# Patient Record
Sex: MALE | Race: WHITE | NOT HISPANIC OR LATINO | Employment: FULL TIME | URBAN - METROPOLITAN AREA
[De-identification: names, ages, dates, MRNs, and addresses within clinical notes are randomized per-mention and may not be internally consistent; named-entity substitution may affect disease eponyms.]

---

## 2023-01-31 PROBLEM — F90.9 ADHD: Status: ACTIVE | Noted: 2023-01-31

## 2023-01-31 PROBLEM — M75.101 ROTATOR CUFF TEAR, RIGHT: Status: ACTIVE | Noted: 2023-01-31

## 2023-01-31 PROBLEM — G47.00 INSOMNIA: Status: ACTIVE | Noted: 2023-01-31

## 2023-01-31 PROBLEM — M16.10 ARTHRITIS, HIP: Status: ACTIVE | Noted: 2023-01-31

## 2023-01-31 PROBLEM — E78.5 HYPERLIPIDEMIA: Status: ACTIVE | Noted: 2023-01-31

## 2023-01-31 PROBLEM — S62.101A WRIST FRACTURE, RIGHT: Status: ACTIVE | Noted: 2023-01-31

## 2023-01-31 PROBLEM — Z79.4 TYPE 2 DIABETES MELLITUS WITH HYPERGLYCEMIA, WITH LONG-TERM CURRENT USE OF INSULIN (MULTI): Status: ACTIVE | Noted: 2023-01-31

## 2023-01-31 PROBLEM — E11.65 TYPE 2 DIABETES MELLITUS WITH HYPERGLYCEMIA, WITH LONG-TERM CURRENT USE OF INSULIN (MULTI): Status: ACTIVE | Noted: 2023-01-31

## 2023-01-31 PROBLEM — M25.859 FEMORAL ACETABULAR IMPINGEMENT: Status: ACTIVE | Noted: 2023-01-31

## 2023-01-31 PROBLEM — M16.11 OSTEOARTHRITIS OF RIGHT HIP: Status: ACTIVE | Noted: 2023-01-31

## 2023-01-31 PROBLEM — I10 BENIGN ESSENTIAL HYPERTENSION: Status: ACTIVE | Noted: 2023-01-31

## 2023-01-31 PROBLEM — M25.531 RIGHT WRIST PAIN: Status: ACTIVE | Noted: 2023-01-31

## 2023-01-31 PROBLEM — D03.9 MELANOMA IN SITU OF THE SKIN (MULTI): Status: ACTIVE | Noted: 2023-01-31

## 2023-01-31 PROBLEM — E55.9 VITAMIN D DEFICIENCY: Status: ACTIVE | Noted: 2023-01-31

## 2023-01-31 PROBLEM — G47.33 OBSTRUCTIVE SLEEP APNEA: Status: ACTIVE | Noted: 2023-01-31

## 2023-01-31 PROBLEM — E29.1 HYPOGONADISM MALE: Status: ACTIVE | Noted: 2023-01-31

## 2023-01-31 PROBLEM — M19.031 PRIMARY OSTEOARTHRITIS OF RIGHT WRIST: Status: ACTIVE | Noted: 2023-01-31

## 2023-01-31 RX ORDER — DEXTROAMPHETAMINE SACCHARATE, AMPHETAMINE ASPARTATE MONOHYDRATE, DEXTROAMPHETAMINE SULFATE AND AMPHETAMINE SULFATE 7.5; 7.5; 7.5; 7.5 MG/1; MG/1; MG/1; MG/1
30 CAPSULE, EXTENDED RELEASE ORAL DAILY
COMMUNITY
Start: 2019-10-09 | End: 2023-03-13 | Stop reason: SDUPTHER

## 2023-01-31 RX ORDER — DIAPER,BRIEF,ADULT, DISPOSABLE
1 EACH MISCELLANEOUS DAILY
COMMUNITY
Start: 2020-09-04

## 2023-01-31 RX ORDER — DEXTROAMPHETAMINE SACCHARATE, AMPHETAMINE ASPARTATE MONOHYDRATE, DEXTROAMPHETAMINE SULFATE AND AMPHETAMINE SULFATE 3.75; 3.75; 3.75; 3.75 MG/1; MG/1; MG/1; MG/1
30 CAPSULE, EXTENDED RELEASE ORAL DAILY
COMMUNITY
End: 2023-03-13 | Stop reason: ALTCHOICE

## 2023-01-31 RX ORDER — SODIUM FLUORIDE1.1%, POTASSIUM NITRATE 5% 5.8; 57.5 MG/ML; MG/ML
GEL, DENTIFRICE DENTAL
COMMUNITY
Start: 2022-08-04 | End: 2023-10-26 | Stop reason: ALTCHOICE

## 2023-01-31 RX ORDER — FOSINOPRIL SODIUM 20 MG/1
1 TABLET ORAL DAILY
COMMUNITY
Start: 2020-04-08 | End: 2023-07-05 | Stop reason: SDUPTHER

## 2023-01-31 RX ORDER — ZOLPIDEM TARTRATE 10 MG/1
10 TABLET ORAL NIGHTLY PRN
COMMUNITY
Start: 2019-11-26 | End: 2023-05-16 | Stop reason: SDUPTHER

## 2023-01-31 RX ORDER — SIMVASTATIN 10 MG/1
1 TABLET, FILM COATED ORAL EVERY EVENING
COMMUNITY
Start: 2020-04-08 | End: 2023-11-14 | Stop reason: SDUPTHER

## 2023-01-31 RX ORDER — METFORMIN HYDROCHLORIDE 1000 MG/1
1 TABLET ORAL 2 TIMES DAILY
COMMUNITY
Start: 2020-07-22 | End: 2023-12-27 | Stop reason: SDUPTHER

## 2023-03-10 ENCOUNTER — TELEPHONE (OUTPATIENT)
Dept: PRIMARY CARE | Facility: CLINIC | Age: 73
End: 2023-03-10

## 2023-03-10 DIAGNOSIS — F90.9 ATTENTION DEFICIT HYPERACTIVITY DISORDER (ADHD), UNSPECIFIED ADHD TYPE: ICD-10-CM

## 2023-03-10 NOTE — TELEPHONE ENCOUNTER
Rx Refill Request Telephone Encounter    Name:  Dale Schrader  :  [id number]  Medication Name:  Amphetamine-Dextroamphet ER 30MG Oral Capsule delayed release take 1 cap daily for ADHD            Specific Pharmacy location:  [facility]  Date of last appointment:  2022  Date of next appointment:  2023  Best number to reach patient:  [phone]

## 2023-03-13 RX ORDER — DEXTROAMPHETAMINE SACCHARATE, AMPHETAMINE ASPARTATE MONOHYDRATE, DEXTROAMPHETAMINE SULFATE AND AMPHETAMINE SULFATE 7.5; 7.5; 7.5; 7.5 MG/1; MG/1; MG/1; MG/1
30 CAPSULE, EXTENDED RELEASE ORAL EVERY MORNING
Qty: 30 CAPSULE | Refills: 0 | Status: SHIPPED | OUTPATIENT
Start: 2023-03-13 | End: 2023-04-14 | Stop reason: SDUPTHER

## 2023-03-14 ENCOUNTER — OFFICE VISIT (OUTPATIENT)
Dept: PRIMARY CARE | Facility: CLINIC | Age: 73
End: 2023-03-14
Payer: COMMERCIAL

## 2023-03-14 VITALS
SYSTOLIC BLOOD PRESSURE: 154 MMHG | TEMPERATURE: 96.4 F | WEIGHT: 238 LBS | DIASTOLIC BLOOD PRESSURE: 87 MMHG | BODY MASS INDEX: 28.1 KG/M2 | OXYGEN SATURATION: 96 % | RESPIRATION RATE: 15 BRPM | HEIGHT: 77 IN | HEART RATE: 87 BPM

## 2023-03-14 DIAGNOSIS — E78.2 MIXED HYPERLIPIDEMIA: ICD-10-CM

## 2023-03-14 DIAGNOSIS — Z79.4 TYPE 2 DIABETES MELLITUS WITH HYPERGLYCEMIA, WITH LONG-TERM CURRENT USE OF INSULIN (MULTI): ICD-10-CM

## 2023-03-14 DIAGNOSIS — E55.9 VITAMIN D DEFICIENCY: ICD-10-CM

## 2023-03-14 DIAGNOSIS — F90.9 ATTENTION DEFICIT HYPERACTIVITY DISORDER (ADHD), UNSPECIFIED ADHD TYPE: ICD-10-CM

## 2023-03-14 DIAGNOSIS — E11.65 TYPE 2 DIABETES MELLITUS WITH HYPERGLYCEMIA, WITH LONG-TERM CURRENT USE OF INSULIN (MULTI): ICD-10-CM

## 2023-03-14 DIAGNOSIS — I10 BENIGN ESSENTIAL HYPERTENSION: Primary | ICD-10-CM

## 2023-03-14 LAB
ALANINE AMINOTRANSFERASE (SGPT) (U/L) IN SER/PLAS: 36 U/L (ref 10–52)
ALBUMIN (G/DL) IN SER/PLAS: 4.2 G/DL (ref 3.4–5)
ALKALINE PHOSPHATASE (U/L) IN SER/PLAS: 53 U/L (ref 33–136)
ANION GAP IN SER/PLAS: 12 MMOL/L (ref 10–20)
ASPARTATE AMINOTRANSFERASE (SGOT) (U/L) IN SER/PLAS: 33 U/L (ref 9–39)
BILIRUBIN TOTAL (MG/DL) IN SER/PLAS: 0.7 MG/DL (ref 0–1.2)
CALCIDIOL (25 OH VITAMIN D3) (NG/ML) IN SER/PLAS: 42 NG/ML
CALCIUM (MG/DL) IN SER/PLAS: 8.9 MG/DL (ref 8.6–10.3)
CARBON DIOXIDE, TOTAL (MMOL/L) IN SER/PLAS: 25 MMOL/L (ref 21–32)
CHLORIDE (MMOL/L) IN SER/PLAS: 105 MMOL/L (ref 98–107)
CHOLESTEROL (MG/DL) IN SER/PLAS: 135 MG/DL (ref 0–199)
CHOLESTEROL IN HDL (MG/DL) IN SER/PLAS: 46.7 MG/DL
CHOLESTEROL/HDL RATIO: 2.9
CREATININE (MG/DL) IN SER/PLAS: 0.86 MG/DL (ref 0.5–1.3)
ESTIMATED AVERAGE GLUCOSE FOR HBA1C: 160 MG/DL
GFR MALE: >90 ML/MIN/1.73M2
GLUCOSE (MG/DL) IN SER/PLAS: 151 MG/DL (ref 74–99)
HEMOGLOBIN A1C/HEMOGLOBIN TOTAL IN BLOOD: 7.2 %
LDL: 62 MG/DL (ref 0–99)
POTASSIUM (MMOL/L) IN SER/PLAS: 4.1 MMOL/L (ref 3.5–5.3)
PROTEIN TOTAL: 7 G/DL (ref 6.4–8.2)
SODIUM (MMOL/L) IN SER/PLAS: 138 MMOL/L (ref 136–145)
TRIGLYCERIDE (MG/DL) IN SER/PLAS: 133 MG/DL (ref 0–149)
UREA NITROGEN (MG/DL) IN SER/PLAS: 12 MG/DL (ref 6–23)
VLDL: 27 MG/DL (ref 0–40)

## 2023-03-14 PROCEDURE — 3079F DIAST BP 80-89 MM HG: CPT | Performed by: FAMILY MEDICINE

## 2023-03-14 PROCEDURE — 4010F ACE/ARB THERAPY RXD/TAKEN: CPT | Performed by: FAMILY MEDICINE

## 2023-03-14 PROCEDURE — 1036F TOBACCO NON-USER: CPT | Performed by: FAMILY MEDICINE

## 2023-03-14 PROCEDURE — 1160F RVW MEDS BY RX/DR IN RCRD: CPT | Performed by: FAMILY MEDICINE

## 2023-03-14 PROCEDURE — 1159F MED LIST DOCD IN RCRD: CPT | Performed by: FAMILY MEDICINE

## 2023-03-14 PROCEDURE — 3051F HG A1C>EQUAL 7.0%<8.0%: CPT | Performed by: FAMILY MEDICINE

## 2023-03-14 PROCEDURE — 3077F SYST BP >= 140 MM HG: CPT | Performed by: FAMILY MEDICINE

## 2023-03-14 PROCEDURE — 99214 OFFICE O/P EST MOD 30 MIN: CPT | Performed by: FAMILY MEDICINE

## 2023-03-14 RX ORDER — CHLORTHALIDONE 25 MG/1
25 TABLET ORAL DAILY
Qty: 30 TABLET | Refills: 5 | Status: SHIPPED | OUTPATIENT
Start: 2023-03-14 | End: 2023-07-05 | Stop reason: SDUPTHER

## 2023-03-14 ASSESSMENT — ENCOUNTER SYMPTOMS
CONFUSION: 0
FEVER: 0
OCCASIONAL FEELINGS OF UNSTEADINESS: 0
ABDOMINAL PAIN: 0
CHILLS: 0
SHORTNESS OF BREATH: 0
DEPRESSION: 0
ARTHRALGIAS: 0
CHEST TIGHTNESS: 0
PALPITATIONS: 0
LOSS OF SENSATION IN FEET: 0

## 2023-03-14 NOTE — ASSESSMENT & PLAN NOTE
HTN is not to goal add chlorthalidone 25 mg daily and recheck blood pressure in approximately 6 weeks

## 2023-03-14 NOTE — PROGRESS NOTES
"Subjective   Patient ID: Dale Schrader is a 72 y.o. male who presents for Follow-up (4 month).    HPI   Patient today for follow-up of ongoing healthcare issues as well as recheck on blood pressure.  He is monitoring blood pressure readings at home and he has been getting 150s to 160s over 80s.  Denies chest pain headache or shortness of breath.  Review of Systems   Constitutional:  Negative for chills and fever.   HENT:  Negative for congestion and ear pain.    Eyes:  Negative for visual disturbance.   Respiratory:  Negative for chest tightness and shortness of breath.    Cardiovascular:  Negative for chest pain and palpitations.   Gastrointestinal:  Negative for abdominal pain.   Musculoskeletal:  Negative for arthralgias.   Skin:  Negative for pallor.   Psychiatric/Behavioral:  Negative for confusion.        Objective   /87   Pulse 87   Temp 35.8 °C (96.4 °F)   Resp 15   Ht 1.956 m (6' 5\")   Wt 108 kg (238 lb)   SpO2 96%   BMI 28.22 kg/m²     Physical Exam  Vitals and nursing note reviewed.   Constitutional:       General: He is not in acute distress.     Appearance: Normal appearance. He is not ill-appearing.   HENT:      Head: Normocephalic and atraumatic.      Right Ear: Tympanic membrane, ear canal and external ear normal.      Left Ear: Tympanic membrane, ear canal and external ear normal.      Mouth/Throat:      Pharynx: Oropharynx is clear.   Eyes:      Extraocular Movements: Extraocular movements intact.   Cardiovascular:      Rate and Rhythm: Normal rate and regular rhythm.      Pulses: Normal pulses.      Heart sounds: Normal heart sounds.   Pulmonary:      Effort: Pulmonary effort is normal.      Breath sounds: Normal breath sounds.   Abdominal:      General: Abdomen is flat. Bowel sounds are normal.      Palpations: Abdomen is soft.      Tenderness: There is no abdominal tenderness.   Musculoskeletal:         General: Normal range of motion.      Cervical back: Neck supple.   Skin:   "   General: Skin is warm.   Neurological:      Mental Status: He is alert and oriented to person, place, and time. Mental status is at baseline.   Psychiatric:         Mood and Affect: Mood normal.     Return to office in 4 months with repeat fasting labs  Return to office in approximately 6 weeks for blood pressure recheck   Assessment/Plan   Problem List Items Addressed This Visit       ADHD     Clinically stable continue Adderall as prescribed         Benign essential hypertension - Primary     HTN is not to goal add chlorthalidone 25 mg daily and recheck blood pressure in approximately 6 weeks         Relevant Medications    chlorthalidone (Hygroton) 25 mg tablet    Other Relevant Orders    Follow Up In Primary Care    Follow Up In Primary Care    Comprehensive Metabolic Panel    Hyperlipidemia     Lipids to goal continue Zocor along with proper diet         Relevant Orders    Comprehensive Metabolic Panel    Lipid Panel    Vitamin D deficiency     D deficiency improved continue to supplement and monitor         Relevant Orders    Vitamin D 1,25 Dihydroxy    Type 2 diabetes mellitus with hyperglycemia, with long-term current use of insulin (CMS/[facility])     A1c down to 7.2% continue current medication         Relevant Orders    Follow Up In Primary Care    Comprehensive Metabolic Panel    Hemoglobin A1C

## 2023-04-12 ENCOUNTER — TELEPHONE (OUTPATIENT)
Dept: PRIMARY CARE | Facility: CLINIC | Age: 73
End: 2023-04-12

## 2023-04-12 DIAGNOSIS — F90.9 ATTENTION DEFICIT HYPERACTIVITY DISORDER (ADHD), UNSPECIFIED ADHD TYPE: ICD-10-CM

## 2023-04-12 NOTE — TELEPHONE ENCOUNTER
Rx Refill Request Telephone Encounter    Name:  Dale Schrader  :  [id number]  Medication Name:      Amphetamine-Dextroamphet ER 30 MG Oral Capsule extended release take 1 cap daily for ADHD                      Specific Pharmacy location:   [facility]    Date of last appointment:  2022  Date of next appointment:  2023  Best number to reach patient:  [phone]

## 2023-04-14 NOTE — TELEPHONE ENCOUNTER
Called patient and told him we could get him in for a nurse visit today in order to have the CMC & UDS done for his Adderall but he stated he will just wait until Monday when Dr. Hernandez is back.     I repended this patient's Adderall for Dr. Hernandez to send in when he is back Monday.

## 2023-04-14 NOTE — TELEPHONE ENCOUNTER
I called the patient and he stated he ran out of medication today. I told the patient that Dr. Hernandez is out of the office and will be back on Monday. I told him I will see if any provider is willing to fill it at least short time until Dr. Hernandez is back on Monday. The patient does not have an up to date CMC & UDS and his next appointment is May 2nd.

## 2023-04-16 RX ORDER — DEXTROAMPHETAMINE SACCHARATE, AMPHETAMINE ASPARTATE MONOHYDRATE, DEXTROAMPHETAMINE SULFATE AND AMPHETAMINE SULFATE 7.5; 7.5; 7.5; 7.5 MG/1; MG/1; MG/1; MG/1
30 CAPSULE, EXTENDED RELEASE ORAL EVERY MORNING
Qty: 30 CAPSULE | Refills: 0 | Status: SHIPPED | OUTPATIENT
Start: 2023-04-16 | End: 2023-05-16 | Stop reason: SDUPTHER

## 2023-05-02 ENCOUNTER — OFFICE VISIT (OUTPATIENT)
Dept: PRIMARY CARE | Facility: CLINIC | Age: 73
End: 2023-05-02
Payer: COMMERCIAL

## 2023-05-02 VITALS
OXYGEN SATURATION: 97 % | HEART RATE: 94 BPM | HEIGHT: 77 IN | WEIGHT: 231.2 LBS | BODY MASS INDEX: 27.3 KG/M2 | SYSTOLIC BLOOD PRESSURE: 152 MMHG | TEMPERATURE: 97.1 F | RESPIRATION RATE: 20 BRPM | DIASTOLIC BLOOD PRESSURE: 88 MMHG

## 2023-05-02 DIAGNOSIS — D03.9: ICD-10-CM

## 2023-05-02 DIAGNOSIS — I10 BENIGN ESSENTIAL HYPERTENSION: Primary | ICD-10-CM

## 2023-05-02 PROCEDURE — 99213 OFFICE O/P EST LOW 20 MIN: CPT | Performed by: FAMILY MEDICINE

## 2023-05-02 PROCEDURE — 3077F SYST BP >= 140 MM HG: CPT | Performed by: FAMILY MEDICINE

## 2023-05-02 PROCEDURE — 1160F RVW MEDS BY RX/DR IN RCRD: CPT | Performed by: FAMILY MEDICINE

## 2023-05-02 PROCEDURE — 1036F TOBACCO NON-USER: CPT | Performed by: FAMILY MEDICINE

## 2023-05-02 PROCEDURE — 1159F MED LIST DOCD IN RCRD: CPT | Performed by: FAMILY MEDICINE

## 2023-05-02 PROCEDURE — 4010F ACE/ARB THERAPY RXD/TAKEN: CPT | Performed by: FAMILY MEDICINE

## 2023-05-02 PROCEDURE — 3079F DIAST BP 80-89 MM HG: CPT | Performed by: FAMILY MEDICINE

## 2023-05-02 PROCEDURE — 3051F HG A1C>EQUAL 7.0%<8.0%: CPT | Performed by: FAMILY MEDICINE

## 2023-05-02 RX ORDER — METOPROLOL SUCCINATE 25 MG/1
25 TABLET, EXTENDED RELEASE ORAL NIGHTLY
Qty: 30 TABLET | Refills: 5 | Status: SHIPPED | OUTPATIENT
Start: 2023-05-02 | End: 2023-07-05 | Stop reason: SDUPTHER

## 2023-05-02 RX ORDER — KETOCONAZOLE 20 MG/G
CREAM TOPICAL
COMMUNITY
Start: 2023-04-14 | End: 2023-10-26 | Stop reason: ALTCHOICE

## 2023-05-02 ASSESSMENT — ENCOUNTER SYMPTOMS
CARDIOVASCULAR NEGATIVE: 1
NEUROLOGICAL NEGATIVE: 1
RESPIRATORY NEGATIVE: 1

## 2023-05-02 NOTE — PROGRESS NOTES
"Subjective   Patient ID: Dale Schrader is a 72 y.o. male who presents for Follow-up (6 week check on blood pressure).    HPI patient today for follow-up on his blood pressure.  He has been tracking blood pressures at home been getting 130s to 150s over 80s in the morning 130s to 120s over 70s and 80s in the afternoons.  Also he continues to follow with dermatology says he is going to need to have additional procedure done on his right forearm.  Review of Systems   Respiratory: Negative.     Cardiovascular: Negative.    Neurological: Negative.        Objective   /88 (BP Location: Right arm, Patient Position: Sitting)   Pulse 94   Temp 36.2 °C (97.1 °F)   Resp 20   Ht 1.956 m (6' 5\")   Wt 105 kg (231 lb 3.2 oz)   SpO2 97%   BMI 27.42 kg/m²     Physical Exam  Constitutional:       Appearance: Normal appearance.   Cardiovascular:      Rate and Rhythm: Normal rate and regular rhythm.      Pulses: Normal pulses.      Heart sounds: Normal heart sounds.   Pulmonary:      Effort: Pulmonary effort is normal. No respiratory distress.      Breath sounds: Normal breath sounds.   Neurological:      Mental Status: He is alert. Mental status is at baseline.       Blood pressure still not to goal.  He will continue his current medications specifically the chlorthalidone and fosinopril in the morning and we will add metoprolol XL 25 mg in the evening  Call if he is getting readings below 100/60  Assessment/Plan   Problem List Items Addressed This Visit       Benign essential hypertension - Primary     BP still not to goal add metoprolol XL 25 mg once at bedtime continue other antihypertensive medications.  And continue to keep blood pressure log         Relevant Medications    metoprolol succinate XL (Toprol-XL) 25 mg 24 hr tablet    Melanoma in situ of the skin (CMS/HCC)     Patient points out that he saw dermatology recently they did a biopsy near the area of his previous Mohs surgery on the right posterior " forearm from what the patient states they did not like the pathology results although they were nonspecific as result there have not been scheduled to go back and do additional surgical resection.

## 2023-05-02 NOTE — ASSESSMENT & PLAN NOTE
BP still not to goal add metoprolol XL 25 mg once at bedtime continue other antihypertensive medications.  And continue to keep blood pressure log

## 2023-05-02 NOTE — ASSESSMENT & PLAN NOTE
Patient points out that he saw dermatology recently they did a biopsy near the area of his previous Mohs surgery on the right posterior forearm from what the patient states they did not like the pathology results although they were nonspecific as result there have not been scheduled to go back and do additional surgical resection.

## 2023-05-16 ENCOUNTER — TELEPHONE (OUTPATIENT)
Dept: PRIMARY CARE | Facility: CLINIC | Age: 73
End: 2023-05-16

## 2023-05-16 DIAGNOSIS — G47.00 INSOMNIA, UNSPECIFIED TYPE: ICD-10-CM

## 2023-05-16 DIAGNOSIS — F90.9 ATTENTION DEFICIT HYPERACTIVITY DISORDER (ADHD), UNSPECIFIED ADHD TYPE: ICD-10-CM

## 2023-05-16 RX ORDER — ZOLPIDEM TARTRATE 10 MG/1
10 TABLET ORAL NIGHTLY PRN
Qty: 30 TABLET | Refills: 2 | Status: SHIPPED | OUTPATIENT
Start: 2023-05-16 | End: 2023-08-22 | Stop reason: SDUPTHER

## 2023-05-16 RX ORDER — DEXTROAMPHETAMINE SACCHARATE, AMPHETAMINE ASPARTATE MONOHYDRATE, DEXTROAMPHETAMINE SULFATE AND AMPHETAMINE SULFATE 7.5; 7.5; 7.5; 7.5 MG/1; MG/1; MG/1; MG/1
30 CAPSULE, EXTENDED RELEASE ORAL EVERY MORNING
Qty: 30 CAPSULE | Refills: 0 | Status: SHIPPED | OUTPATIENT
Start: 2023-05-16 | End: 2023-06-14 | Stop reason: SDUPTHER

## 2023-05-16 NOTE — TELEPHONE ENCOUNTER
Rx Refill Request Telephone Encounter    Name:  Dale Schrader  :  [id number]  Medication Name:  zolpidem   10 mg  oral  Frequency : daily at bedtime  90    Specific Pharmacy location:  [facility]  Date of last appointment:    Date of next appointment:        Best number to reach patient:  [phone]    Rx Refill Request Telephone Encounter    Name:  Dale Schrader  :  [id number]  Medication Name:  Adderall ER   Dose : 30mg  oral  Frequency : daily  30    Specific Pharmacy location:    Date of last appointment:    Date of next appointment:    Best number to reach patient:

## 2023-06-14 ENCOUNTER — TELEPHONE (OUTPATIENT)
Dept: PRIMARY CARE | Facility: CLINIC | Age: 73
End: 2023-06-14

## 2023-06-14 DIAGNOSIS — F90.9 ATTENTION DEFICIT HYPERACTIVITY DISORDER (ADHD), UNSPECIFIED ADHD TYPE: ICD-10-CM

## 2023-06-14 RX ORDER — DEXTROAMPHETAMINE SACCHARATE, AMPHETAMINE ASPARTATE MONOHYDRATE, DEXTROAMPHETAMINE SULFATE AND AMPHETAMINE SULFATE 7.5; 7.5; 7.5; 7.5 MG/1; MG/1; MG/1; MG/1
30 CAPSULE, EXTENDED RELEASE ORAL EVERY MORNING
Qty: 30 CAPSULE | Refills: 0 | Status: SHIPPED | OUTPATIENT
Start: 2023-06-14 | End: 2023-08-22 | Stop reason: SDUPTHER

## 2023-06-14 NOTE — TELEPHONE ENCOUNTER
Rx Refill Request Telephone Encounter    Name:  Dale Schrader  :  [id number]  Medication Name:      amphetamine-dextroamphetamine XR (Adderall XR) 30 mg 24 hr capsule         Specific Pharmacy location:   [facility] [place] [address] [address]      Date of last appointment:  23  Date of next appointment: 23   Best number to reach patient: [phone]

## 2023-06-29 ENCOUNTER — LAB (OUTPATIENT)
Dept: LAB | Facility: LAB | Age: 73
End: 2023-06-29
Payer: COMMERCIAL

## 2023-06-29 DIAGNOSIS — E78.2 MIXED HYPERLIPIDEMIA: ICD-10-CM

## 2023-06-29 DIAGNOSIS — E55.9 VITAMIN D DEFICIENCY: ICD-10-CM

## 2023-06-29 DIAGNOSIS — Z79.4 TYPE 2 DIABETES MELLITUS WITH HYPERGLYCEMIA, WITH LONG-TERM CURRENT USE OF INSULIN (MULTI): ICD-10-CM

## 2023-06-29 DIAGNOSIS — E11.65 TYPE 2 DIABETES MELLITUS WITH HYPERGLYCEMIA, WITH LONG-TERM CURRENT USE OF INSULIN (MULTI): ICD-10-CM

## 2023-06-29 DIAGNOSIS — I10 BENIGN ESSENTIAL HYPERTENSION: ICD-10-CM

## 2023-06-29 LAB
ALANINE AMINOTRANSFERASE (SGPT) (U/L) IN SER/PLAS: 46 U/L (ref 10–52)
ALBUMIN (G/DL) IN SER/PLAS: 4.4 G/DL (ref 3.4–5)
ALKALINE PHOSPHATASE (U/L) IN SER/PLAS: 65 U/L (ref 33–136)
ANION GAP IN SER/PLAS: 15 MMOL/L (ref 10–20)
ASPARTATE AMINOTRANSFERASE (SGOT) (U/L) IN SER/PLAS: 42 U/L (ref 9–39)
BILIRUBIN TOTAL (MG/DL) IN SER/PLAS: 1.1 MG/DL (ref 0–1.2)
CALCIUM (MG/DL) IN SER/PLAS: 9.1 MG/DL (ref 8.6–10.3)
CARBON DIOXIDE, TOTAL (MMOL/L) IN SER/PLAS: 24 MMOL/L (ref 21–32)
CHLORIDE (MMOL/L) IN SER/PLAS: 101 MMOL/L (ref 98–107)
CHOLESTEROL (MG/DL) IN SER/PLAS: 148 MG/DL (ref 0–199)
CHOLESTEROL IN HDL (MG/DL) IN SER/PLAS: 40.9 MG/DL
CHOLESTEROL/HDL RATIO: 3.6
CREATININE (MG/DL) IN SER/PLAS: 1.19 MG/DL (ref 0.5–1.3)
ESTIMATED AVERAGE GLUCOSE FOR HBA1C: 197 MG/DL
GFR MALE: 65 ML/MIN/1.73M2
GLUCOSE (MG/DL) IN SER/PLAS: 199 MG/DL (ref 74–99)
HEMOGLOBIN A1C/HEMOGLOBIN TOTAL IN BLOOD: 8.5 %
LDL: 83 MG/DL (ref 0–99)
POTASSIUM (MMOL/L) IN SER/PLAS: 4.6 MMOL/L (ref 3.5–5.3)
PROTEIN TOTAL: 7.1 G/DL (ref 6.4–8.2)
SODIUM (MMOL/L) IN SER/PLAS: 135 MMOL/L (ref 136–145)
TRIGLYCERIDE (MG/DL) IN SER/PLAS: 121 MG/DL (ref 0–149)
UREA NITROGEN (MG/DL) IN SER/PLAS: 19 MG/DL (ref 6–23)
VLDL: 24 MG/DL (ref 0–40)

## 2023-06-29 PROCEDURE — 83036 HEMOGLOBIN GLYCOSYLATED A1C: CPT

## 2023-06-29 PROCEDURE — 80061 LIPID PANEL: CPT

## 2023-06-29 PROCEDURE — 80053 COMPREHEN METABOLIC PANEL: CPT

## 2023-06-29 PROCEDURE — 82652 VIT D 1 25-DIHYDROXY: CPT

## 2023-06-29 PROCEDURE — 36415 COLL VENOUS BLD VENIPUNCTURE: CPT

## 2023-07-03 LAB — VITAMIN D 1,25-DIHYDROXY: 54.6 PG/ML (ref 19.9–79.3)

## 2023-07-05 ENCOUNTER — OFFICE VISIT (OUTPATIENT)
Dept: PRIMARY CARE | Facility: CLINIC | Age: 73
End: 2023-07-05
Payer: MEDICARE

## 2023-07-05 VITALS
OXYGEN SATURATION: 97 % | DIASTOLIC BLOOD PRESSURE: 79 MMHG | SYSTOLIC BLOOD PRESSURE: 148 MMHG | HEART RATE: 79 BPM | BODY MASS INDEX: 27.75 KG/M2 | HEIGHT: 77 IN | RESPIRATION RATE: 14 BRPM | WEIGHT: 235 LBS

## 2023-07-05 DIAGNOSIS — Z12.5 SCREENING FOR PROSTATE CANCER: ICD-10-CM

## 2023-07-05 DIAGNOSIS — Z00.00 MEDICARE ANNUAL WELLNESS VISIT, INITIAL: ICD-10-CM

## 2023-07-05 DIAGNOSIS — Z00.00 ANNUAL PHYSICAL EXAM: ICD-10-CM

## 2023-07-05 DIAGNOSIS — I10 BENIGN ESSENTIAL HYPERTENSION: ICD-10-CM

## 2023-07-05 DIAGNOSIS — Z79.4 TYPE 2 DIABETES MELLITUS WITH HYPERGLYCEMIA, WITH LONG-TERM CURRENT USE OF INSULIN (MULTI): ICD-10-CM

## 2023-07-05 DIAGNOSIS — E11.65 TYPE 2 DIABETES MELLITUS WITH HYPERGLYCEMIA, WITH LONG-TERM CURRENT USE OF INSULIN (MULTI): ICD-10-CM

## 2023-07-05 DIAGNOSIS — Z79.899 CONTROLLED SUBSTANCE AGREEMENT SIGNED: ICD-10-CM

## 2023-07-05 DIAGNOSIS — F90.0 ATTENTION DEFICIT HYPERACTIVITY DISORDER (ADHD), PREDOMINANTLY INATTENTIVE TYPE: ICD-10-CM

## 2023-07-05 DIAGNOSIS — Z13.29 THYROID DISORDER SCREEN: ICD-10-CM

## 2023-07-05 DIAGNOSIS — E55.9 VITAMIN D DEFICIENCY: Primary | ICD-10-CM

## 2023-07-05 DIAGNOSIS — E78.2 MIXED HYPERLIPIDEMIA: ICD-10-CM

## 2023-07-05 DIAGNOSIS — D03.9: ICD-10-CM

## 2023-07-05 LAB
POC AMPHETAMINE: POSITIVE NG/ML
POC BARBITURATES: NEGATIVE NG/ML
POC BENZODIAZEPINES: NEGATIVE NG/ML
POC BUPRENORPHINE URINE: NEGATIVE NG/ML
POC COCAINE: NEGATIVE NG/ML
POC MDMA (NG/ML) IN URINE: NEGATIVE NG/ML
POC METHADONE MANUALLY ENTERED: NEGATIVE NG/ML
POC METHAMPHETAMINE: NEGATIVE NG/ML
POC MORPHINE URINE: NEGATIVE NG/ML
POC OPIATES: ABNORMAL NG/ML
POC OXYCODONE: NEGATIVE NG/ML
POC PHENCYCLIDINE (PCP): NEGATIVE NG/ML
POC THC: NEGATIVE NG/ML
POC TICYCLIC ANTIDEPRESSANTS (TCA): ABNORMAL NG/ML

## 2023-07-05 PROCEDURE — G0438 PPPS, INITIAL VISIT: HCPCS | Performed by: FAMILY MEDICINE

## 2023-07-05 PROCEDURE — 1170F FXNL STATUS ASSESSED: CPT | Performed by: FAMILY MEDICINE

## 2023-07-05 PROCEDURE — 3078F DIAST BP <80 MM HG: CPT | Performed by: FAMILY MEDICINE

## 2023-07-05 PROCEDURE — 4010F ACE/ARB THERAPY RXD/TAKEN: CPT | Performed by: FAMILY MEDICINE

## 2023-07-05 PROCEDURE — 3052F HG A1C>EQUAL 8.0%<EQUAL 9.0%: CPT | Performed by: FAMILY MEDICINE

## 2023-07-05 PROCEDURE — 99397 PER PM REEVAL EST PAT 65+ YR: CPT | Performed by: FAMILY MEDICINE

## 2023-07-05 PROCEDURE — 3077F SYST BP >= 140 MM HG: CPT | Performed by: FAMILY MEDICINE

## 2023-07-05 PROCEDURE — 99214 OFFICE O/P EST MOD 30 MIN: CPT | Performed by: FAMILY MEDICINE

## 2023-07-05 PROCEDURE — 1160F RVW MEDS BY RX/DR IN RCRD: CPT | Performed by: FAMILY MEDICINE

## 2023-07-05 PROCEDURE — 80305 DRUG TEST PRSMV DIR OPT OBS: CPT | Performed by: FAMILY MEDICINE

## 2023-07-05 PROCEDURE — 1159F MED LIST DOCD IN RCRD: CPT | Performed by: FAMILY MEDICINE

## 2023-07-05 PROCEDURE — 1036F TOBACCO NON-USER: CPT | Performed by: FAMILY MEDICINE

## 2023-07-05 RX ORDER — METOPROLOL SUCCINATE 25 MG/1
25 TABLET, EXTENDED RELEASE ORAL NIGHTLY
Qty: 90 TABLET | Refills: 1 | Status: SHIPPED | OUTPATIENT
Start: 2023-07-05 | End: 2023-07-05

## 2023-07-05 RX ORDER — GLIPIZIDE 5 MG/1
5 TABLET ORAL DAILY
Qty: 90 TABLET | Refills: 3 | Status: SHIPPED | OUTPATIENT
Start: 2023-07-05 | End: 2023-07-05

## 2023-07-05 RX ORDER — FOSINOPRIL SODIUM 20 MG/1
20 TABLET ORAL DAILY
Qty: 90 TABLET | Refills: 3 | Status: SHIPPED | OUTPATIENT
Start: 2023-07-05 | End: 2024-03-14 | Stop reason: WASHOUT

## 2023-07-05 RX ORDER — CHLORTHALIDONE 25 MG/1
25 TABLET ORAL DAILY
Qty: 90 TABLET | Refills: 3 | Status: SHIPPED | OUTPATIENT
Start: 2023-07-05 | End: 2023-07-05

## 2023-07-05 ASSESSMENT — ENCOUNTER SYMPTOMS
CHILLS: 0
ABDOMINAL PAIN: 0
SHORTNESS OF BREATH: 0
FEVER: 0
PALPITATIONS: 0
DEPRESSION: 0
CONFUSION: 0
CHEST TIGHTNESS: 0
OCCASIONAL FEELINGS OF UNSTEADINESS: 0
LOSS OF SENSATION IN FEET: 0
ARTHRALGIAS: 0

## 2023-07-05 ASSESSMENT — ACTIVITIES OF DAILY LIVING (ADL)
TAKING_MEDICATION: INDEPENDENT
GROCERY_SHOPPING: INDEPENDENT
BATHING: INDEPENDENT
TAKING_MEDICATION: INDEPENDENT
DOING_HOUSEWORK: INDEPENDENT
DOING_HOUSEWORK: INDEPENDENT
MANAGING_FINANCES: INDEPENDENT
GROCERY_SHOPPING: INDEPENDENT
DRESSING: INDEPENDENT
DRESSING: INDEPENDENT
BATHING: INDEPENDENT
MANAGING_FINANCES: INDEPENDENT

## 2023-07-05 ASSESSMENT — PATIENT HEALTH QUESTIONNAIRE - PHQ9
2. FEELING DOWN, DEPRESSED OR HOPELESS: NOT AT ALL
1. LITTLE INTEREST OR PLEASURE IN DOING THINGS: NOT AT ALL
SUM OF ALL RESPONSES TO PHQ9 QUESTIONS 1 AND 2: 0

## 2023-07-05 NOTE — ASSESSMENT & PLAN NOTE
A1c is gone up to 8.5% we discussed additional medications wants to avoid brand-name drugs due to cost we will add Glucotrol 5 mg once a day reviewed diabetic diet and continue metformin

## 2023-07-05 NOTE — PROGRESS NOTES
"Subjective   Reason for Visit: Dale Schrader is an 73 y.o. male here for a Medicare Wellness visit.     Past Medical, Surgical, and Family History reviewed and updated in chart.    Reviewed all medications by prescribing practitioner or clinical pharmacist (such as prescriptions, OTCs, herbal therapies and supplements) and documented in the medical record.    HPI  Patient today for follow-up of ongoing healthcare issues and review of lab work.  Overall is feeling okay.  He recently retired.  Patient Care Team:  Dyllan Hernandez MD as PCP - General  Dyllan Hernandez MD as PCP - Employee [employer] PCP     Review of Systems   Constitutional:  Negative for chills and fever.   HENT:  Negative for congestion and ear pain.    Eyes:  Negative for visual disturbance.   Respiratory:  Negative for chest tightness and shortness of breath.    Cardiovascular:  Negative for chest pain and palpitations.   Gastrointestinal:  Negative for abdominal pain.   Musculoskeletal:  Negative for arthralgias.   Skin:  Negative for pallor.   Psychiatric/Behavioral:  Negative for confusion.        Objective   Vitals:  /79   Pulse 79   Resp 14   Ht 1.956 m (6' 5\")   Wt 107 kg (235 lb)   SpO2 97%   BMI 27.87 kg/m²       Physical Exam  Vitals and nursing note reviewed.   Constitutional:       General: He is not in acute distress.     Appearance: Normal appearance. He is not ill-appearing.   HENT:      Head: Normocephalic and atraumatic.      Right Ear: Tympanic membrane, ear canal and external ear normal.      Left Ear: Tympanic membrane, ear canal and external ear normal.      Mouth/Throat:      Pharynx: Oropharynx is clear.   Eyes:      Extraocular Movements: Extraocular movements intact.   Cardiovascular:      Rate and Rhythm: Normal rate and regular rhythm.      Pulses: Normal pulses.      Heart sounds: Normal heart sounds.   Pulmonary:      Effort: Pulmonary effort is normal.      Breath sounds: Normal breath sounds.   Abdominal: "      General: Abdomen is flat. Bowel sounds are normal.      Palpations: Abdomen is soft.      Tenderness: There is no abdominal tenderness.   Musculoskeletal:         General: Normal range of motion.      Cervical back: Neck supple.   Skin:     General: Skin is warm.   Neurological:      Mental Status: He is alert and oriented to person, place, and time. Mental status is at baseline.   Psychiatric:         Mood and Affect: Mood normal.       Recent Results (from the past 1008 hour(s))   Comprehensive Metabolic Panel    Collection Time: 06/29/23  7:49 AM   Result Value Ref Range    Glucose 199 (H) 74 - 99 mg/dL    Sodium 135 (L) 136 - 145 mmol/L    Potassium 4.6 3.5 - 5.3 mmol/L    Chloride 101 98 - 107 mmol/L    Bicarbonate 24 21 - 32 mmol/L    Anion Gap 15 10 - 20 mmol/L    Urea Nitrogen 19 6 - 23 mg/dL    Creatinine 1.19 0.50 - 1.30 mg/dL    GFR MALE 65 >90 mL/min/1.73m2    Calcium 9.1 8.6 - 10.3 mg/dL    Albumin 4.4 3.4 - 5.0 g/dL    Alkaline Phosphatase 65 33 - 136 U/L    Total Protein 7.1 6.4 - 8.2 g/dL    AST 42 (H) 9 - 39 U/L    Total Bilirubin 1.1 0.0 - 1.2 mg/dL    ALT (SGPT) 46 10 - 52 U/L   Hemoglobin A1C    Collection Time: 06/29/23  7:49 AM   Result Value Ref Range    Hemoglobin A1C 8.5 (A) %    Estimated Average Glucose 197 MG/DL   Lipid Panel    Collection Time: 06/29/23  7:49 AM   Result Value Ref Range    Cholesterol 148 0 - 199 mg/dL    HDL 40.9 mg/dL    Cholesterol/HDL Ratio 3.6     LDL 83 0 - 99 mg/dL    VLDL 24 0 - 40 mg/dL    Triglycerides 121 0 - 149 mg/dL   Vitamin D 1,25 Dihydroxy    Collection Time: 06/29/23  7:49 AM   Result Value Ref Range    Vit D, 1,25-Dihydroxy 54.6 19.9 - 79.3 pg/mL   POCT waived urine drug screen manually resulted    Collection Time: 07/05/23 12:28 PM   Result Value Ref Range    POC THC Negative Negative, Not applicable ng/mL    POC Cocaine Negative Negative, Not applicable ng/mL    POC Opiates Not applicable Negative, Not applicable ng/mL    POC Amphetamine  Positive (A) Negative, Not applicable ng/mL    POC Phencyclidine (PCP) Negative Negative, Not applicable ng/mL    POC Barbiturates Negative Negative, Not applicable ng/mL    POC Benzodiazepines Negative Negative, Not applicable ng/mL    POC Methamphetamine Negative Negative, Not applicable ng/mL    POC METHADONE MANUALLY ENTERED Negative Negative, Not applicable ng/mL    POC Ticyclic Antidepressants (TCA) Not applicable Negative, Not applicable ng/mL    POC Oxycodone Negative Negative, Not applicable ng/mL    POC MDMA URINE Negative Negative, Not applicable ng/mL    POC Morphine Urine Negative Negative, Not applicable ng/mL    POC Burprenorphine Urine Negative Negative, Not applicable ng/mL     Recent labs reviewed with patient  Once he is drifted upward Glucotrol 5 mg once a day added to regimen and reviewed diabetic diet  Patient to consider colon cancer screening we discussed options he states he will consider and let us know.      Assessment/Plan   Problem List Items Addressed This Visit       ADHD    Current Assessment & Plan     Stable continue current medication         Relevant Orders    Follow Up In Primary Care - Established    Benign essential hypertension    Relevant Medications    metoprolol succinate XL (Toprol-XL) 25 mg 24 hr tablet    chlorthalidone (Hygroton) 25 mg tablet    fosinopril (Monopril) 20 mg tablet    Other Relevant Orders    CBC    Comprehensive Metabolic Panel    Follow Up In Primary Care - Established    Hyperlipidemia    Current Assessment & Plan     Stable continue current treatment work on dietary modifications         Relevant Orders    Comprehensive Metabolic Panel    Lipid Panel    Follow Up In Primary Care - Established    Melanoma in situ of the skin (CMS/HCC)    Current Assessment & Plan     Status post resection continue to follow with dermatology         Vitamin D deficiency - Primary    Current Assessment & Plan     Continue to monitor and supplement         Relevant Orders     Vitamin D, Total    Type 2 diabetes mellitus with hyperglycemia, with long-term current use of insulin (CMS/[facility])    Current Assessment & Plan     A1c is gone up to 8.5% we discussed additional medications wants to avoid brand-name drugs due to cost we will add Glucotrol 5 mg once a day reviewed diabetic diet and continue metformin         Relevant Medications    glipiZIDE (Glucotrol) 5 mg tablet    Other Relevant Orders    CBC    Comprehensive Metabolic Panel    Hemoglobin A1C    Albumin , Urine Random    Follow Up In Primary Care - Established    Screening for prostate cancer    Current Assessment & Plan     Screening PSA before next office appointment         Relevant Orders    Prostate Specific Antigen, Screen    Thyroid disorder screen    Current Assessment & Plan     TSH with reflex         Relevant Orders    TSH with reflex to Free T4 if abnormal    Medicare annual wellness visit, initial    Current Assessment & Plan     Pneumonia vaccine series completed  Discussed recommendations with regards to Shingrix           Annual physical exam    Controlled substance agreement signed    Current Assessment & Plan     Urine drug screen completed         Relevant Orders    POCT waived urine drug screen manually resulted (Completed)

## 2023-07-06 DIAGNOSIS — Z12.11 ENCOUNTER FOR SCREENING COLONOSCOPY: Primary | ICD-10-CM

## 2023-07-19 ENCOUNTER — APPOINTMENT (OUTPATIENT)
Dept: PRIMARY CARE | Facility: CLINIC | Age: 73
End: 2023-07-19

## 2023-08-22 ENCOUNTER — TELEPHONE (OUTPATIENT)
Dept: PRIMARY CARE | Facility: CLINIC | Age: 73
End: 2023-08-22
Payer: MEDICARE

## 2023-08-22 DIAGNOSIS — G47.00 INSOMNIA, UNSPECIFIED TYPE: ICD-10-CM

## 2023-08-22 DIAGNOSIS — F90.9 ATTENTION DEFICIT HYPERACTIVITY DISORDER (ADHD), UNSPECIFIED ADHD TYPE: ICD-10-CM

## 2023-08-22 RX ORDER — DEXTROAMPHETAMINE SACCHARATE, AMPHETAMINE ASPARTATE MONOHYDRATE, DEXTROAMPHETAMINE SULFATE AND AMPHETAMINE SULFATE 7.5; 7.5; 7.5; 7.5 MG/1; MG/1; MG/1; MG/1
30 CAPSULE, EXTENDED RELEASE ORAL EVERY MORNING
Qty: 30 CAPSULE | Refills: 0 | Status: SHIPPED | OUTPATIENT
Start: 2023-08-22 | End: 2023-09-25 | Stop reason: SDUPTHER

## 2023-08-22 RX ORDER — ZOLPIDEM TARTRATE 10 MG/1
10 TABLET ORAL NIGHTLY PRN
Qty: 30 TABLET | Refills: 2 | Status: SHIPPED | OUTPATIENT
Start: 2023-08-22 | End: 2023-08-22

## 2023-08-22 NOTE — TELEPHONE ENCOUNTER
Rx Refill Request Telephone Encounter    Name:  Dale Schrader  :  [id number]  Medication Name:    zolpidem (Ambien) 10 mg tablet       amphetamine-dextroamphetamine XR (Adderall XR) 30 mg 24 hr capsule             Specific Pharmacy location:   [facility] - [place] [address] [address]         Date of last appointment:  23  Date of next appointment: 23   Best number to reach patient: [phone]

## 2023-09-21 ENCOUNTER — TELEPHONE (OUTPATIENT)
Dept: PRIMARY CARE | Facility: CLINIC | Age: 73
End: 2023-09-21
Payer: MEDICARE

## 2023-09-21 DIAGNOSIS — F90.9 ATTENTION DEFICIT HYPERACTIVITY DISORDER (ADHD), UNSPECIFIED ADHD TYPE: ICD-10-CM

## 2023-09-21 NOTE — TELEPHONE ENCOUNTER
Rx Refill Request Telephone Encounter    Name:  Dale Schrader  :  [id number]  Medication Name:      amphetamine-dextroamphetamine XR (Adderall XR) 30 mg 24 hr capsule [[id number]]  1 cap taken daily in the am                  Specific Pharmacy location:   [facility]    Date of last appointment:  2023  Date of next appointment:  2023  Best number to reach patient:  [phone]

## 2023-09-25 RX ORDER — DEXTROAMPHETAMINE SACCHARATE, AMPHETAMINE ASPARTATE MONOHYDRATE, DEXTROAMPHETAMINE SULFATE AND AMPHETAMINE SULFATE 7.5; 7.5; 7.5; 7.5 MG/1; MG/1; MG/1; MG/1
30 CAPSULE, EXTENDED RELEASE ORAL EVERY MORNING
Qty: 30 CAPSULE | Refills: 0 | Status: SHIPPED | OUTPATIENT
Start: 2023-09-25 | End: 2023-09-25

## 2023-10-01 ENCOUNTER — PHARMACY VISIT (OUTPATIENT)
Dept: PHARMACY | Facility: CLINIC | Age: 73
End: 2023-10-01
Payer: MEDICARE

## 2023-10-01 PROCEDURE — RXMED WILLOW AMBULATORY MEDICATION CHARGE

## 2023-10-18 ENCOUNTER — PHARMACY VISIT (OUTPATIENT)
Dept: PHARMACY | Facility: CLINIC | Age: 73
End: 2023-10-18
Payer: MEDICARE

## 2023-10-18 PROCEDURE — RXMED WILLOW AMBULATORY MEDICATION CHARGE

## 2023-10-18 RX ORDER — PENICILLIN V POTASSIUM 500 MG/1
500 TABLET, FILM COATED ORAL 4 TIMES DAILY
Qty: 28 TABLET | Refills: 0 | OUTPATIENT
Start: 2023-10-18 | End: 2023-11-09 | Stop reason: ALTCHOICE

## 2023-10-23 ENCOUNTER — TELEPHONE (OUTPATIENT)
Dept: PRIMARY CARE | Facility: CLINIC | Age: 73
End: 2023-10-23
Payer: MEDICARE

## 2023-10-23 ENCOUNTER — PHARMACY VISIT (OUTPATIENT)
Dept: PHARMACY | Facility: CLINIC | Age: 73
End: 2023-10-23
Payer: MEDICARE

## 2023-10-23 DIAGNOSIS — F90.9 ATTENTION DEFICIT HYPERACTIVITY DISORDER (ADHD), UNSPECIFIED ADHD TYPE: ICD-10-CM

## 2023-10-23 PROCEDURE — RXMED WILLOW AMBULATORY MEDICATION CHARGE

## 2023-10-23 RX ORDER — DEXTROAMPHETAMINE SACCHARATE, AMPHETAMINE ASPARTATE MONOHYDRATE, DEXTROAMPHETAMINE SULFATE AND AMPHETAMINE SULFATE 7.5; 7.5; 7.5; 7.5 MG/1; MG/1; MG/1; MG/1
30 CAPSULE, EXTENDED RELEASE ORAL EVERY MORNING
Qty: 30 CAPSULE | Refills: 0 | Status: SHIPPED | OUTPATIENT
Start: 2023-10-23 | End: 2023-11-28 | Stop reason: SDUPTHER

## 2023-10-23 NOTE — TELEPHONE ENCOUNTER
Rx Refill Request Telephone Encounter    Name:  Dale Schrader  :  [id number]  Medication Name:  Adderall XR  Dose : 30 mg  Route : oral  Frequency : daily  30    Specific Pharmacy location:  [facility]  Date of last appointment:    Date of next appointment:    Best number to reach patient:  [phone]

## 2023-10-24 ENCOUNTER — PHARMACY VISIT (OUTPATIENT)
Dept: PHARMACY | Facility: CLINIC | Age: 73
End: 2023-10-24

## 2023-10-24 PROCEDURE — RXOTC WILLOW AMBULATORY OTC CHARGE

## 2023-10-25 ENCOUNTER — ANESTHESIA EVENT (OUTPATIENT)
Dept: GASTROENTEROLOGY | Facility: HOSPITAL | Age: 73
End: 2023-10-25

## 2023-10-25 ENCOUNTER — PHARMACY VISIT (OUTPATIENT)
Dept: PHARMACY | Facility: CLINIC | Age: 73
End: 2023-10-25
Payer: MEDICARE

## 2023-10-25 ENCOUNTER — TELEPHONE (OUTPATIENT)
Dept: GASTROENTEROLOGY | Facility: CLINIC | Age: 73
End: 2023-10-25
Payer: MEDICARE

## 2023-10-25 DIAGNOSIS — Z12.11 COLON CANCER SCREENING: Primary | ICD-10-CM

## 2023-10-25 PROCEDURE — RXMED WILLOW AMBULATORY MEDICATION CHARGE

## 2023-10-25 RX ORDER — POLYETHYLENE GLYCOL 3350, SODIUM SULFATE ANHYDROUS, SODIUM BICARBONATE, SODIUM CHLORIDE, POTASSIUM CHLORIDE 236; 22.74; 6.74; 5.86; 2.97 G/4L; G/4L; G/4L; G/4L; G/4L
4000 POWDER, FOR SOLUTION ORAL ONCE
Qty: 4000 ML | Refills: 0 | Status: SHIPPED | OUTPATIENT
Start: 2023-10-25 | End: 2023-10-26 | Stop reason: ALTCHOICE

## 2023-10-26 ENCOUNTER — ANESTHESIA (OUTPATIENT)
Dept: GASTROENTEROLOGY | Facility: HOSPITAL | Age: 73
End: 2023-10-26
Payer: MEDICARE

## 2023-10-26 ENCOUNTER — HOSPITAL ENCOUNTER (OUTPATIENT)
Dept: GASTROENTEROLOGY | Facility: HOSPITAL | Age: 73
Discharge: HOME | End: 2023-10-26
Payer: MEDICARE

## 2023-10-26 VITALS
HEART RATE: 82 BPM | OXYGEN SATURATION: 98 % | SYSTOLIC BLOOD PRESSURE: 151 MMHG | RESPIRATION RATE: 16 BRPM | TEMPERATURE: 97.8 F | DIASTOLIC BLOOD PRESSURE: 94 MMHG

## 2023-10-26 DIAGNOSIS — Z12.11 ENCOUNTER FOR SCREENING FOR MALIGNANT NEOPLASM OF COLON: ICD-10-CM

## 2023-10-26 PROBLEM — N20.0 RENAL CALCULI: Status: ACTIVE | Noted: 2023-10-26

## 2023-10-26 LAB — GLUCOSE BLD MANUAL STRIP-MCNC: 172 MG/DL (ref 74–99)

## 2023-10-26 PROCEDURE — 3700000002 HC GENERAL ANESTHESIA TIME - EACH INCREMENTAL 1 MINUTE: Performed by: INTERNAL MEDICINE

## 2023-10-26 PROCEDURE — 7100000009 HC PHASE TWO TIME - INITIAL BASE CHARGE: Performed by: INTERNAL MEDICINE

## 2023-10-26 PROCEDURE — 3700000001 HC GENERAL ANESTHESIA TIME - INITIAL BASE CHARGE: Performed by: INTERNAL MEDICINE

## 2023-10-26 PROCEDURE — 82947 ASSAY GLUCOSE BLOOD QUANT: CPT

## 2023-10-26 PROCEDURE — 88305 TISSUE EXAM BY PATHOLOGIST: CPT | Mod: TC | Performed by: INTERNAL MEDICINE

## 2023-10-26 PROCEDURE — 88305 TISSUE EXAM BY PATHOLOGIST: CPT | Mod: TC,SUR | Performed by: INTERNAL MEDICINE

## 2023-10-26 PROCEDURE — 45385 COLONOSCOPY W/LESION REMOVAL: CPT | Performed by: INTERNAL MEDICINE

## 2023-10-26 PROCEDURE — 2500000005 HC RX 250 GENERAL PHARMACY W/O HCPCS: Performed by: NURSE ANESTHETIST, CERTIFIED REGISTERED

## 2023-10-26 PROCEDURE — 2500000004 HC RX 250 GENERAL PHARMACY W/ HCPCS (ALT 636 FOR OP/ED): Performed by: NURSE ANESTHETIST, CERTIFIED REGISTERED

## 2023-10-26 PROCEDURE — 88305 TISSUE EXAM BY PATHOLOGIST: CPT | Performed by: PATHOLOGY

## 2023-10-26 PROCEDURE — 7100000010 HC PHASE TWO TIME - EACH INCREMENTAL 1 MINUTE: Performed by: INTERNAL MEDICINE

## 2023-10-26 RX ORDER — LIDOCAINE HYDROCHLORIDE 20 MG/ML
INJECTION, SOLUTION EPIDURAL; INFILTRATION; INTRACAUDAL; PERINEURAL AS NEEDED
Status: DISCONTINUED | OUTPATIENT
Start: 2023-10-26 | End: 2023-10-26

## 2023-10-26 RX ORDER — SODIUM CHLORIDE 9 MG/ML
30 INJECTION, SOLUTION INTRAVENOUS CONTINUOUS
Status: DISCONTINUED | OUTPATIENT
Start: 2023-10-26 | End: 2023-10-27 | Stop reason: HOSPADM

## 2023-10-26 RX ORDER — PROPOFOL 10 MG/ML
INJECTION, EMULSION INTRAVENOUS AS NEEDED
Status: DISCONTINUED | OUTPATIENT
Start: 2023-10-26 | End: 2023-10-26

## 2023-10-26 RX ADMIN — PROPOFOL 50 MG: 10 INJECTION, EMULSION INTRAVENOUS at 12:35

## 2023-10-26 RX ADMIN — PROPOFOL 50 MG: 10 INJECTION, EMULSION INTRAVENOUS at 12:25

## 2023-10-26 RX ADMIN — PROPOFOL 100 MG: 10 INJECTION, EMULSION INTRAVENOUS at 12:17

## 2023-10-26 RX ADMIN — LIDOCAINE HYDROCHLORIDE 50 MG: 20 INJECTION, SOLUTION EPIDURAL; INFILTRATION; INTRACAUDAL; PERINEURAL at 12:17

## 2023-10-26 RX ADMIN — PROPOFOL 50 MG: 10 INJECTION, EMULSION INTRAVENOUS at 12:40

## 2023-10-26 RX ADMIN — PROPOFOL 50 MG: 10 INJECTION, EMULSION INTRAVENOUS at 12:45

## 2023-10-26 RX ADMIN — PROPOFOL 50 MG: 10 INJECTION, EMULSION INTRAVENOUS at 12:30

## 2023-10-26 RX ADMIN — SODIUM CHLORIDE: 9 INJECTION, SOLUTION INTRAVENOUS at 12:04

## 2023-10-26 RX ADMIN — PROPOFOL 50 MG: 10 INJECTION, EMULSION INTRAVENOUS at 12:19

## 2023-10-26 SDOH — HEALTH STABILITY: MENTAL HEALTH: CURRENT SMOKER: 0

## 2023-10-26 ASSESSMENT — PAIN SCALES - GENERAL
PAINLEVEL_OUTOF10: 0 - NO PAIN
PAIN_LEVEL: 0
PAINLEVEL_OUTOF10: 0 - NO PAIN

## 2023-10-26 ASSESSMENT — PAIN - FUNCTIONAL ASSESSMENT
PAIN_FUNCTIONAL_ASSESSMENT: 0-10

## 2023-10-26 ASSESSMENT — COLUMBIA-SUICIDE SEVERITY RATING SCALE - C-SSRS
1. IN THE PAST MONTH, HAVE YOU WISHED YOU WERE DEAD OR WISHED YOU COULD GO TO SLEEP AND NOT WAKE UP?: NO
2. HAVE YOU ACTUALLY HAD ANY THOUGHTS OF KILLING YOURSELF?: NO
6. HAVE YOU EVER DONE ANYTHING, STARTED TO DO ANYTHING, OR PREPARED TO DO ANYTHING TO END YOUR LIFE?: NO

## 2023-10-26 NOTE — ANESTHESIA PREPROCEDURE EVALUATION
Patient: Dale Schrader    Procedure Information       Date/Time: 10/26/23 1100    Scheduled providers: Josue Christiansen MD    Procedure: COLONOSCOPY    Location: [facility]            Relevant Problems   Anesthesia (within normal limits)      Cardiovascular   (+) Benign essential hypertension   (+) Hyperlipidemia      Endocrine   (+) Type 2 diabetes mellitus with hyperglycemia, with long-term current use of insulin (CMS/HCC) (Accu check 172)      GI (within normal limits)      /Renal   (+) Renal calculi      Neuro/Psych   (+) ADHD      Pulmonary   (+) Obstructive sleep apnea      GI/Hepatic (within normal limits)      Hematology (within normal limits)      Musculoskeletal   (+) Osteoarthritis of right hip   (+) Primary osteoarthritis of right wrist      Other   (+) Arthritis, hip       Clinical information reviewed:   Tobacco  Allergies  Meds   Med Hx  Surg Hx   Fam Hx  Soc Hx        NPO Detail:  NPO/Void Status  Date of Last Liquid: 10/26/23  Time of Last Liquid: 0730  Date of Last Solid: 10/24/23  Last Intake Type: Clear fluids         Physical Exam    Airway  Mallampati: II  TM distance: >3 FB  Neck ROM: full     Cardiovascular   Rhythm: regular  Rate: normal     Dental - normal exam       Pulmonary - normal exam     Abdominal - normal exam             Anesthesia Plan    ASA 2     MAC     The patient is not a current smoker.  Patient was previously instructed to abstain from smoking on day of procedure.  Patient did not smoke on day of procedure.    intravenous induction   Anesthetic plan and risks discussed with patient.

## 2023-10-26 NOTE — H&P
History Of Present Illness  Dale Schrader is a 73 y.o. male presenting for colon cancer screening examination..     Past Medical History  He has a past medical history of ADHD (attention deficit hyperactivity disorder), Diabetes type 2, controlled (CMS/HCC), Hypertension, Other long term (current) drug therapy, Personal history of malignant melanoma of skin, Sleep apnea, and Unspecified injury of right hip, initial encounter (09/18/2019).    Surgical History  He has a past surgical history that includes Other surgical history (10/01/2019) and Other surgical history (10/01/2019).     Social History  He reports that he has never smoked. He has been exposed to tobacco smoke. He has never used smokeless tobacco. He reports current alcohol use of about 3.0 standard drinks of alcohol per week. He reports that he does not use drugs.    Family History  Family History   Problem Relation Name Age of Onset    Breast cancer Mother      Diabetes type I Son      Cancer Other          Allergies  Patient has no known allergies.    Review of Systems  Constitutional: no fever, no chills, fatigue,  not feeling tired and no recent weight loss. No reports of dizziness.  SKIN: No skin rash or lesions  EYE: No pain photophobia, diplopia or blurred vision  EAR: No discharge, pain or hearing loss  NOSE: No congestion, epistaxis or obstruction  RESPIRATORY: No cough , dyspnea or  chest pain   CV: No chest pain, lower extremity swelling or palpitations  GE: No abdominal pain, nausea, vomiting, dysphagia or odynophagia. Denied constipation , diarrhea  : No dysuria or hematuria, urinary incontinence or urine frequency  NEURO: Denied weakness, confusion, dizziness, or numbness   PSYCH : Denies anxiety, hallucinations or insomnia  HEME/ LYMPH : Denied bleeding , bruising or enlarged nodes  ENDOCRINE: No change in weight, polydipsia, or abnormal bleeding  .          Physical Exam  Head and neck examinations were  unremarkable. There was no  temporal wasting. No jaundice noted. No thyromegaly.  No carotid bruits.  There is no peripheral lymphadenopathy.  Lungs were clear to auscultation. There when no rales, rhonchi or wheezes.  Cardiac examination revealed normal heart sounds. Rhythm was sinus . There were no murmurs.  Abdomen was full and soft. There was no organomegaly. Bowel sounds were normo- active. There was no ascites. The abdomen was nontender.  Rectal examination was not done.  Extremities: No edema or joint swelling.  Neurological examination: Patient was alert, oriented in person place, and time. There when no focal neurological signs. Cranial nerves were grossly intact. No evidence of encephalopathy. There was no asterixis. The  plantar response was flexor.       Last Recorded Vitals  Blood pressure (!) 159/92, pulse 86, temperature 36.7 °C (98 °F), temperature source Temporal, resp. rate 16, SpO2 96 %.    Relevant Results        none     Assessment/Plan  He is here for his scheduled colon cancer screening examination.  The patient is asymptomatic.  Colonoscopy will proceed as planned.    Josue Christiansen MD

## 2023-10-26 NOTE — ANESTHESIA POSTPROCEDURE EVALUATION
Patient: Dale Schrader    Procedure Summary       Date: 10/26/23 Room / Location: [facility]    Anesthesia Start: 1213 Anesthesia Stop: 1253    Procedure: COLONOSCOPY Diagnosis: Encounter for screening for malignant neoplasm of colon    Scheduled Providers: Josue Christiansen MD Responsible Provider: MARTY Middleton    Anesthesia Type: MAC ASA Status: 2            Anesthesia Type: MAC    Vitals Value Taken Time   /76 10/26/23 1254   Temp 97.9 10/26/23 1254   Pulse 88 10/26/23 1254   Resp 16 10/26/23 1254   SpO2 95 10/26/23 1254       Anesthesia Post Evaluation    Patient location during evaluation: bedside  Level of consciousness: awake and alert  Pain score: 0  Pain management: adequate  Airway patency: patent  Cardiovascular status: acceptable  Respiratory status: acceptable  Hydration status: acceptable        There were no known notable events for this encounter.

## 2023-10-30 ENCOUNTER — TELEPHONE (OUTPATIENT)
Dept: GASTROENTEROLOGY | Facility: CLINIC | Age: 73
End: 2023-10-30
Payer: MEDICARE

## 2023-11-01 ENCOUNTER — PHARMACY VISIT (OUTPATIENT)
Dept: PHARMACY | Facility: CLINIC | Age: 73
End: 2023-11-01
Payer: MEDICARE

## 2023-11-01 PROCEDURE — RXMED WILLOW AMBULATORY MEDICATION CHARGE

## 2023-11-06 LAB
LABORATORY COMMENT REPORT: NORMAL
PATH REPORT.FINAL DX SPEC: NORMAL
PATH REPORT.GROSS SPEC: NORMAL
PATH REPORT.RELEVANT HX SPEC: NORMAL
PATH REPORT.TOTAL CANCER: NORMAL

## 2023-11-08 ENCOUNTER — LAB (OUTPATIENT)
Dept: LAB | Facility: LAB | Age: 73
End: 2023-11-08
Payer: MEDICARE

## 2023-11-08 DIAGNOSIS — E11.65 TYPE 2 DIABETES MELLITUS WITH HYPERGLYCEMIA, WITH LONG-TERM CURRENT USE OF INSULIN (MULTI): ICD-10-CM

## 2023-11-08 DIAGNOSIS — Z13.29 THYROID DISORDER SCREEN: ICD-10-CM

## 2023-11-08 DIAGNOSIS — E55.9 VITAMIN D DEFICIENCY: ICD-10-CM

## 2023-11-08 DIAGNOSIS — Z12.5 SCREENING FOR PROSTATE CANCER: ICD-10-CM

## 2023-11-08 DIAGNOSIS — Z79.4 TYPE 2 DIABETES MELLITUS WITH HYPERGLYCEMIA, WITH LONG-TERM CURRENT USE OF INSULIN (MULTI): ICD-10-CM

## 2023-11-08 DIAGNOSIS — E78.2 MIXED HYPERLIPIDEMIA: ICD-10-CM

## 2023-11-08 DIAGNOSIS — I10 BENIGN ESSENTIAL HYPERTENSION: ICD-10-CM

## 2023-11-08 LAB
25(OH)D3 SERPL-MCNC: 64 NG/ML (ref 30–100)
ALBUMIN SERPL BCP-MCNC: 4.6 G/DL (ref 3.4–5)
ALP SERPL-CCNC: 55 U/L (ref 33–136)
ALT SERPL W P-5'-P-CCNC: 32 U/L (ref 10–52)
ANION GAP SERPL CALC-SCNC: 14 MMOL/L (ref 10–20)
AST SERPL W P-5'-P-CCNC: 31 U/L (ref 9–39)
BILIRUB SERPL-MCNC: 0.7 MG/DL (ref 0–1.2)
BUN SERPL-MCNC: 21 MG/DL (ref 6–23)
CALCIUM SERPL-MCNC: 9.2 MG/DL (ref 8.6–10.3)
CHLORIDE SERPL-SCNC: 103 MMOL/L (ref 98–107)
CHOLEST SERPL-MCNC: 133 MG/DL (ref 0–199)
CHOLESTEROL/HDL RATIO: 2.9
CO2 SERPL-SCNC: 23 MMOL/L (ref 21–32)
CREAT SERPL-MCNC: 1.26 MG/DL (ref 0.5–1.3)
ERYTHROCYTE [DISTWIDTH] IN BLOOD BY AUTOMATED COUNT: 12.3 % (ref 11.5–14.5)
GFR SERPL CREATININE-BSD FRML MDRD: 61 ML/MIN/1.73M*2
GLUCOSE SERPL-MCNC: 87 MG/DL (ref 74–99)
HCT VFR BLD AUTO: 44.1 % (ref 41–52)
HDLC SERPL-MCNC: 45.6 MG/DL
HGB BLD-MCNC: 15.1 G/DL (ref 13.5–17.5)
LDLC SERPL CALC-MCNC: 65 MG/DL
MCH RBC QN AUTO: 31.5 PG (ref 26–34)
MCHC RBC AUTO-ENTMCNC: 34.2 G/DL (ref 32–36)
MCV RBC AUTO: 92 FL (ref 80–100)
NON HDL CHOLESTEROL: 87 MG/DL (ref 0–149)
NRBC BLD-RTO: 0 /100 WBCS (ref 0–0)
PLATELET # BLD AUTO: 288 X10*3/UL (ref 150–450)
POTASSIUM SERPL-SCNC: 4.2 MMOL/L (ref 3.5–5.3)
PROT SERPL-MCNC: 7.3 G/DL (ref 6.4–8.2)
PSA SERPL-MCNC: 1.21 NG/ML
RBC # BLD AUTO: 4.79 X10*6/UL (ref 4.5–5.9)
SODIUM SERPL-SCNC: 136 MMOL/L (ref 136–145)
TRIGL SERPL-MCNC: 110 MG/DL (ref 0–149)
TSH SERPL-ACNC: 2.52 MIU/L (ref 0.44–3.98)
VLDL: 22 MG/DL (ref 0–40)
WBC # BLD AUTO: 11 X10*3/UL (ref 4.4–11.3)

## 2023-11-08 PROCEDURE — 80053 COMPREHEN METABOLIC PANEL: CPT

## 2023-11-08 PROCEDURE — 36415 COLL VENOUS BLD VENIPUNCTURE: CPT

## 2023-11-08 PROCEDURE — 85027 COMPLETE CBC AUTOMATED: CPT

## 2023-11-08 PROCEDURE — 80061 LIPID PANEL: CPT

## 2023-11-08 PROCEDURE — 84443 ASSAY THYROID STIM HORMONE: CPT

## 2023-11-08 PROCEDURE — 82306 VITAMIN D 25 HYDROXY: CPT

## 2023-11-08 PROCEDURE — G0103 PSA SCREENING: HCPCS

## 2023-11-08 PROCEDURE — 83036 HEMOGLOBIN GLYCOSYLATED A1C: CPT

## 2023-11-09 ENCOUNTER — LAB (OUTPATIENT)
Dept: LAB | Facility: LAB | Age: 73
End: 2023-11-09
Payer: MEDICARE

## 2023-11-09 ENCOUNTER — OFFICE VISIT (OUTPATIENT)
Dept: PRIMARY CARE | Facility: CLINIC | Age: 73
End: 2023-11-09
Payer: MEDICARE

## 2023-11-09 VITALS
DIASTOLIC BLOOD PRESSURE: 82 MMHG | BODY MASS INDEX: 26.85 KG/M2 | HEART RATE: 91 BPM | TEMPERATURE: 97.2 F | WEIGHT: 227.4 LBS | OXYGEN SATURATION: 95 % | SYSTOLIC BLOOD PRESSURE: 136 MMHG | HEIGHT: 77 IN

## 2023-11-09 DIAGNOSIS — F90.0 ATTENTION DEFICIT HYPERACTIVITY DISORDER (ADHD), PREDOMINANTLY INATTENTIVE TYPE: ICD-10-CM

## 2023-11-09 DIAGNOSIS — E11.65 TYPE 2 DIABETES MELLITUS WITH HYPERGLYCEMIA, WITH LONG-TERM CURRENT USE OF INSULIN (MULTI): Primary | ICD-10-CM

## 2023-11-09 DIAGNOSIS — Z79.4 TYPE 2 DIABETES MELLITUS WITH HYPERGLYCEMIA, WITH LONG-TERM CURRENT USE OF INSULIN (MULTI): ICD-10-CM

## 2023-11-09 DIAGNOSIS — E11.65 TYPE 2 DIABETES MELLITUS WITH HYPERGLYCEMIA, WITH LONG-TERM CURRENT USE OF INSULIN (MULTI): ICD-10-CM

## 2023-11-09 DIAGNOSIS — G47.00 INSOMNIA, UNSPECIFIED TYPE: ICD-10-CM

## 2023-11-09 DIAGNOSIS — Z23 ENCOUNTER FOR IMMUNIZATION: ICD-10-CM

## 2023-11-09 DIAGNOSIS — Z79.4 TYPE 2 DIABETES MELLITUS WITH HYPERGLYCEMIA, WITH LONG-TERM CURRENT USE OF INSULIN (MULTI): Primary | ICD-10-CM

## 2023-11-09 DIAGNOSIS — I10 BENIGN ESSENTIAL HYPERTENSION: ICD-10-CM

## 2023-11-09 DIAGNOSIS — N20.0 RENAL CALCULI: ICD-10-CM

## 2023-11-09 DIAGNOSIS — M16.10 ARTHRITIS, HIP: ICD-10-CM

## 2023-11-09 DIAGNOSIS — E55.9 VITAMIN D DEFICIENCY: ICD-10-CM

## 2023-11-09 DIAGNOSIS — E78.2 MIXED HYPERLIPIDEMIA: ICD-10-CM

## 2023-11-09 PROBLEM — M25.531 RIGHT WRIST PAIN: Status: RESOLVED | Noted: 2023-01-31 | Resolved: 2023-11-09

## 2023-11-09 PROBLEM — S62.101A WRIST FRACTURE, RIGHT: Status: RESOLVED | Noted: 2023-01-31 | Resolved: 2023-11-09

## 2023-11-09 LAB
CREAT UR-MCNC: 141.6 MG/DL (ref 20–370)
EST. AVERAGE GLUCOSE BLD GHB EST-MCNC: 143 MG/DL
HBA1C MFR BLD: 6.6 %
MICROALBUMIN UR-MCNC: <7 MG/L
MICROALBUMIN/CREAT UR: NORMAL MG/G{CREAT}

## 2023-11-09 PROCEDURE — 90662 IIV NO PRSV INCREASED AG IM: CPT | Performed by: FAMILY MEDICINE

## 2023-11-09 PROCEDURE — 99214 OFFICE O/P EST MOD 30 MIN: CPT | Performed by: FAMILY MEDICINE

## 2023-11-09 PROCEDURE — 82043 UR ALBUMIN QUANTITATIVE: CPT

## 2023-11-09 PROCEDURE — 1126F AMNT PAIN NOTED NONE PRSNT: CPT | Performed by: FAMILY MEDICINE

## 2023-11-09 PROCEDURE — 82570 ASSAY OF URINE CREATININE: CPT

## 2023-11-09 PROCEDURE — 1036F TOBACCO NON-USER: CPT | Performed by: FAMILY MEDICINE

## 2023-11-09 PROCEDURE — 1159F MED LIST DOCD IN RCRD: CPT | Performed by: FAMILY MEDICINE

## 2023-11-09 PROCEDURE — 1160F RVW MEDS BY RX/DR IN RCRD: CPT | Performed by: FAMILY MEDICINE

## 2023-11-09 PROCEDURE — 3079F DIAST BP 80-89 MM HG: CPT | Performed by: FAMILY MEDICINE

## 2023-11-09 PROCEDURE — 3044F HG A1C LEVEL LT 7.0%: CPT | Performed by: FAMILY MEDICINE

## 2023-11-09 PROCEDURE — 3048F LDL-C <100 MG/DL: CPT | Performed by: FAMILY MEDICINE

## 2023-11-09 PROCEDURE — 4010F ACE/ARB THERAPY RXD/TAKEN: CPT | Performed by: FAMILY MEDICINE

## 2023-11-09 PROCEDURE — 3075F SYST BP GE 130 - 139MM HG: CPT | Performed by: FAMILY MEDICINE

## 2023-11-09 PROCEDURE — G0008 ADMIN INFLUENZA VIRUS VAC: HCPCS | Performed by: FAMILY MEDICINE

## 2023-11-09 ASSESSMENT — ENCOUNTER SYMPTOMS
CHEST TIGHTNESS: 0
FEVER: 0
PALPITATIONS: 0
CONFUSION: 0
CHILLS: 0
ABDOMINAL PAIN: 0
ARTHRALGIAS: 1
SHORTNESS OF BREATH: 0

## 2023-11-09 NOTE — ASSESSMENT & PLAN NOTE
Refer back to orthopedics per patient request right hip starting to bother him a little bit more wanted to see about maybe getting a shot.

## 2023-11-09 NOTE — ASSESSMENT & PLAN NOTE
A1c 6.6% nice improvement compared to the summer.  Continue current medication and dietary modifications

## 2023-11-09 NOTE — PROGRESS NOTES
"Subjective   Patient ID: Dale Schrader is a 73 y.o. male who presents for Follow-up (4 Months ).    HPI   Patient today for follow-up of ongoing healthcare issues and review of blood work for most part states he has been doing well.  He says that over the summer he did have a kidney stone had to go to the ER for evaluation.  While in the ER he believes that he passed the stone.  He never did follow-up with urology but states he has been feeling fine ever since.  To his knowledge he has never had a stone but says he does have a family history of kidney stones.  Also would like referral back to orthopedics for evaluation of his arthritic hips he says his right hip has been bothering him a little more often.  Review of Systems   Constitutional:  Negative for chills and fever.   HENT:  Negative for congestion and ear pain.    Eyes:  Negative for visual disturbance.   Respiratory:  Negative for chest tightness and shortness of breath.    Cardiovascular:  Negative for chest pain and palpitations.   Gastrointestinal:  Negative for abdominal pain.   Musculoskeletal:  Positive for arthralgias.   Skin:  Negative for pallor.   Psychiatric/Behavioral:  Negative for confusion.        Objective   /82 (BP Location: Right arm, Patient Position: Sitting, BP Cuff Size: Adult)   Pulse 91   Temp 36.2 °C (97.2 °F)   Ht 1.956 m (6' 5\")   Wt 103 kg (227 lb 6.4 oz)   SpO2 95%   BMI 26.97 kg/m²     Physical Exam  Vitals and nursing note reviewed.   Constitutional:       General: He is not in acute distress.     Appearance: Normal appearance. He is not ill-appearing.   HENT:      Head: Normocephalic and atraumatic.      Right Ear: Tympanic membrane, ear canal and external ear normal.      Left Ear: Tympanic membrane, ear canal and external ear normal.      Mouth/Throat:      Pharynx: Oropharynx is clear.   Eyes:      Extraocular Movements: Extraocular movements intact.   Cardiovascular:      Rate and Rhythm: Normal rate and " regular rhythm.      Pulses: Normal pulses.      Heart sounds: Normal heart sounds.   Pulmonary:      Effort: Pulmonary effort is normal.      Breath sounds: Normal breath sounds.   Abdominal:      General: Abdomen is flat. Bowel sounds are normal.      Palpations: Abdomen is soft.      Tenderness: There is no abdominal tenderness.   Musculoskeletal:         General: Normal range of motion.      Cervical back: Neck supple.   Skin:     General: Skin is warm.   Neurological:      Mental Status: He is alert and oriented to person, place, and time. Mental status is at baseline.   Psychiatric:         Mood and Affect: Mood normal.       Recent Results (from the past 1008 hour(s))   POCT GLUCOSE    Collection Time: 10/26/23 10:42 AM   Result Value Ref Range    POCT Glucose 172 (H) 74 - 99 mg/dL   Surgical Pathology Exam    Collection Time: 10/26/23 12:46 PM   Result Value Ref Range    Case Report       Surgical Pathology                                Case: [id number]                                  Authorizing Provider:  Josue Christiansen MD      Collected:           10/26/2023 1246              Ordering Location:     [facility]    Received:            10/26/2023 [address] [place] [address]                                                                     Pathologist:           Destiny Roy DO                                                          Specimen:    COLON - SIGMOID POLYP, POLYP AT 20CM                                                       FINAL DIAGNOSIS       A. COLON - SIGMOID POLYP AT 20 CM, BIOPSY:   Hyperplastic polyp              By the signature on this report, the individual or group listed as making the Final Interpretation/Diagnosis certifies that they have reviewed this case.       Clinical History       Screening for malignant neoplasm of colon Z12.11      Gross Description       Received in formalin, labeled with the patient's name and hospital number  "and \"sigmoid polyp\", is a fragment of tan, soft tissue measuring 0.3 x 0.2 x 0.2 cm.  The specimen is submitted in toto in one cassette.  SBS     CBC    Collection Time: 11/08/23  3:31 PM   Result Value Ref Range    WBC 11.0 4.4 - 11.3 x10*3/uL    nRBC 0.0 0.0 - 0.0 /100 WBCs    RBC 4.79 4.50 - 5.90 x10*6/uL    Hemoglobin 15.1 13.5 - 17.5 g/dL    Hematocrit 44.1 41.0 - 52.0 %    MCV 92 80 - 100 fL    MCH 31.5 26.0 - 34.0 pg    MCHC 34.2 32.0 - 36.0 g/dL    RDW 12.3 11.5 - 14.5 %    Platelets 288 150 - 450 x10*3/uL   Comprehensive Metabolic Panel    Collection Time: 11/08/23  3:31 PM   Result Value Ref Range    Glucose 87 74 - 99 mg/dL    Sodium 136 136 - 145 mmol/L    Potassium 4.2 3.5 - 5.3 mmol/L    Chloride 103 98 - 107 mmol/L    Bicarbonate 23 21 - 32 mmol/L    Anion Gap 14 10 - 20 mmol/L    Urea Nitrogen 21 6 - 23 mg/dL    Creatinine 1.26 0.50 - 1.30 mg/dL    eGFR 61 >60 mL/min/1.73m*2    Calcium 9.2 8.6 - 10.3 mg/dL    Albumin 4.6 3.4 - 5.0 g/dL    Alkaline Phosphatase 55 33 - 136 U/L    Total Protein 7.3 6.4 - 8.2 g/dL    AST 31 9 - 39 U/L    Bilirubin, Total 0.7 0.0 - 1.2 mg/dL    ALT 32 10 - 52 U/L   Hemoglobin A1C    Collection Time: 11/08/23  3:31 PM   Result Value Ref Range    Hemoglobin A1C 6.6 (H) see below %    Estimated Average Glucose 143 Not Established mg/dL   Lipid Panel    Collection Time: 11/08/23  3:31 PM   Result Value Ref Range    Cholesterol 133 0 - 199 mg/dL    HDL-Cholesterol 45.6 mg/dL    Cholesterol/HDL Ratio 2.9     LDL Calculated 65 mg/dL    VLDL 22 0 - 40 mg/dL    Triglycerides 110 0 - 149 mg/dL    Non HDL Cholesterol 87 0 - 149 mg/dL   Prostate Specific Antigen, Screen    Collection Time: 11/08/23  3:31 PM   Result Value Ref Range    Prostate Specific Antigen,Screen 1.21 <=4.00 ng/mL   TSH with reflex to Free T4 if abnormal    Collection Time: 11/08/23  3:31 PM   Result Value Ref Range    Thyroid Stimulating Hormone 2.52 0.44 - 3.98 mIU/L   Vitamin D, Total    Collection Time: " 11/08/23  3:31 PM   Result Value Ref Range    Vitamin D, 25-Hydroxy, Total 64 30 - 100 ng/mL     Recent labs reviewed with patient    Orthopedic referral per request    High-dose flu shot x1    KUB follow-up on renal calculi further plans pending results    Return to our office 4 months with repeat fasting labs    Assessment/Plan   Problem List Items Addressed This Visit             ICD-10-CM    ADHD F90.9     Stable continue current medication         Relevant Orders    Follow Up In Primary Care - Established    Benign essential hypertension I10     Stable continue current medication         Relevant Orders    Follow Up In Primary Care - Established    Comprehensive Metabolic Panel    Arthritis, hip M16.10     Refer back to orthopedics per patient request right hip starting to bother him a little bit more wanted to see about maybe getting a shot.         Relevant Orders    Referral to Orthopaedic Surgery    Hyperlipidemia E78.5     Stable continue to work on dietary modifications along with Zocor 10 mg daily         Relevant Orders    Follow Up In Primary Care - Established    Cholesterol, LDL Direct    Comprehensive Metabolic Panel    Lipid Panel    Insomnia G47.00     Stable continue zolpidem         Relevant Orders    Follow Up In Primary Care - Established    Vitamin D deficiency E55.9     Continue to monitor supplement as needed         Type 2 diabetes mellitus with hyperglycemia, with long-term current use of insulin (CMS/[facility]) - Primary E11.65, Z79.4     A1c 6.6% nice improvement compared to the summer.  Continue current medication and dietary modifications         Relevant Orders    Follow Up In Primary Care - Established    Comprehensive Metabolic Panel    Hemoglobin A1C    Renal calculi N20.0     Check follow-up KUB for any residual stone or fragments         Relevant Orders    XR abdomen 3+ views    Encounter for immunization Z23     High-dose flu shot x1 today         Relevant Orders    Flu vaccine,  quadrivalent, high-dose, preservative free, age 65y+ (FLUZONE) (Completed)

## 2023-11-14 ENCOUNTER — TELEPHONE (OUTPATIENT)
Dept: PRIMARY CARE | Facility: CLINIC | Age: 73
End: 2023-11-14
Payer: MEDICARE

## 2023-11-14 ENCOUNTER — PHARMACY VISIT (OUTPATIENT)
Dept: PHARMACY | Facility: CLINIC | Age: 73
End: 2023-11-14
Payer: MEDICARE

## 2023-11-14 DIAGNOSIS — E78.2 MIXED HYPERLIPIDEMIA: ICD-10-CM

## 2023-11-14 DIAGNOSIS — G47.00 INSOMNIA, UNSPECIFIED TYPE: ICD-10-CM

## 2023-11-14 PROCEDURE — RXMED WILLOW AMBULATORY MEDICATION CHARGE

## 2023-11-14 RX ORDER — ZOLPIDEM TARTRATE 10 MG/1
10 TABLET ORAL NIGHTLY PRN
Qty: 30 TABLET | Refills: 2 | Status: SHIPPED | OUTPATIENT
Start: 2023-11-14 | End: 2024-02-21 | Stop reason: SDUPTHER

## 2023-11-14 RX ORDER — SIMVASTATIN 10 MG/1
10 TABLET, FILM COATED ORAL EVERY EVENING
Qty: 90 TABLET | Refills: 3 | Status: SHIPPED | OUTPATIENT
Start: 2023-11-14 | End: 2024-05-16 | Stop reason: SDUPTHER

## 2023-11-14 NOTE — TELEPHONE ENCOUNTER
Rx Refill Request Telephone Encounter    Name:  Dale Schrader  :  [id number]  Medication Name:    zolpidem (Ambien) 10 mg tablet   Route: TAKE 1 TABLET BY MOUTH AT BEDTIME AS NEEDED FOR SLEEP     simvastatin (Zocor) 10 mg tablet   Route: Take 1 tablet (10 mg) by mouth once daily in the evening       Specific Pharmacy location:   [facility]         Date of last appointment:  23  Date of next appointment: 3/14/24   Best number to reach patient: [phone]

## 2023-11-28 ENCOUNTER — PHARMACY VISIT (OUTPATIENT)
Dept: PHARMACY | Facility: CLINIC | Age: 73
End: 2023-11-28
Payer: MEDICARE

## 2023-11-28 ENCOUNTER — TELEPHONE (OUTPATIENT)
Dept: PRIMARY CARE | Facility: CLINIC | Age: 73
End: 2023-11-28
Payer: MEDICARE

## 2023-11-28 DIAGNOSIS — F90.9 ATTENTION DEFICIT HYPERACTIVITY DISORDER (ADHD), UNSPECIFIED ADHD TYPE: ICD-10-CM

## 2023-11-28 PROCEDURE — RXMED WILLOW AMBULATORY MEDICATION CHARGE

## 2023-11-28 RX ORDER — DEXTROAMPHETAMINE SACCHARATE, AMPHETAMINE ASPARTATE MONOHYDRATE, DEXTROAMPHETAMINE SULFATE AND AMPHETAMINE SULFATE 7.5; 7.5; 7.5; 7.5 MG/1; MG/1; MG/1; MG/1
30 CAPSULE, EXTENDED RELEASE ORAL EVERY MORNING
Qty: 30 CAPSULE | Refills: 0 | Status: SHIPPED | OUTPATIENT
Start: 2023-11-28 | End: 2023-12-27 | Stop reason: SDUPTHER

## 2023-11-28 NOTE — TELEPHONE ENCOUNTER
Rx Refill Request Telephone Encounter    Name:  Dale Schrader  :  [id number]  Medication Name:  Adderall XR  30 mg  Route : oral  Frequency : daily  Quantity : 30    Specific Pharmacy location:  [facility]  Date of last appointment:    Date of next appointment:    Best number to reach patient:  [phone]

## 2023-12-13 ENCOUNTER — PHARMACY VISIT (OUTPATIENT)
Dept: PHARMACY | Facility: CLINIC | Age: 73
End: 2023-12-13
Payer: MEDICARE

## 2023-12-13 PROCEDURE — RXMED WILLOW AMBULATORY MEDICATION CHARGE

## 2023-12-27 ENCOUNTER — TELEPHONE (OUTPATIENT)
Dept: PRIMARY CARE | Facility: CLINIC | Age: 73
End: 2023-12-27
Payer: MEDICARE

## 2023-12-27 DIAGNOSIS — E11.65 TYPE 2 DIABETES MELLITUS WITH HYPERGLYCEMIA, WITH LONG-TERM CURRENT USE OF INSULIN (MULTI): ICD-10-CM

## 2023-12-27 DIAGNOSIS — Z79.4 TYPE 2 DIABETES MELLITUS WITH HYPERGLYCEMIA, WITH LONG-TERM CURRENT USE OF INSULIN (MULTI): ICD-10-CM

## 2023-12-27 DIAGNOSIS — F90.9 ATTENTION DEFICIT HYPERACTIVITY DISORDER (ADHD), UNSPECIFIED ADHD TYPE: ICD-10-CM

## 2023-12-27 NOTE — TELEPHONE ENCOUNTER
Rx Refill Request Telephone Encounter    Name:  Dale Schrader  :  [id number]  Medication Name:      metFORMIN (Glucophage) 1,000 mg tablet     amphetamine-dextroamphetamine XR (Adderall XR) 30 mg 24 hr capsule     Specific Pharmacy location:  [facility]   Date of last appointment:  23  Date of next appointment: 3/14/24   Best number to reach patient: [phone]

## 2023-12-28 ENCOUNTER — PHARMACY VISIT (OUTPATIENT)
Dept: PHARMACY | Facility: CLINIC | Age: 73
End: 2023-12-28
Payer: MEDICARE

## 2023-12-28 PROCEDURE — RXMED WILLOW AMBULATORY MEDICATION CHARGE

## 2023-12-28 RX ORDER — DEXTROAMPHETAMINE SACCHARATE, AMPHETAMINE ASPARTATE MONOHYDRATE, DEXTROAMPHETAMINE SULFATE AND AMPHETAMINE SULFATE 7.5; 7.5; 7.5; 7.5 MG/1; MG/1; MG/1; MG/1
30 CAPSULE, EXTENDED RELEASE ORAL EVERY MORNING
Qty: 30 CAPSULE | Refills: 0 | Status: SHIPPED | OUTPATIENT
Start: 2023-12-28 | End: 2024-01-25 | Stop reason: SDUPTHER

## 2023-12-28 RX ORDER — METFORMIN HYDROCHLORIDE 1000 MG/1
1000 TABLET ORAL 2 TIMES DAILY
Qty: 180 TABLET | Refills: 3 | Status: SHIPPED | OUTPATIENT
Start: 2023-12-28 | End: 2024-12-27

## 2023-12-28 RX ORDER — FOSINOPRIL SODIUM 20 MG/1
20 TABLET ORAL DAILY
Qty: 90 TABLET | Refills: 3 | OUTPATIENT
Start: 2023-07-05

## 2023-12-29 ENCOUNTER — PHARMACY VISIT (OUTPATIENT)
Dept: PHARMACY | Facility: CLINIC | Age: 73
End: 2023-12-29

## 2024-01-05 ENCOUNTER — APPOINTMENT (OUTPATIENT)
Dept: ORTHOPEDIC SURGERY | Facility: HOSPITAL | Age: 74
End: 2024-01-05
Payer: MEDICARE

## 2024-01-17 PROCEDURE — RXMED WILLOW AMBULATORY MEDICATION CHARGE

## 2024-01-23 ENCOUNTER — PHARMACY VISIT (OUTPATIENT)
Dept: PHARMACY | Facility: CLINIC | Age: 74
End: 2024-01-23
Payer: MEDICARE

## 2024-01-25 ENCOUNTER — TELEPHONE (OUTPATIENT)
Dept: PRIMARY CARE | Facility: CLINIC | Age: 74
End: 2024-01-25
Payer: MEDICARE

## 2024-01-25 DIAGNOSIS — F90.9 ATTENTION DEFICIT HYPERACTIVITY DISORDER (ADHD), UNSPECIFIED ADHD TYPE: ICD-10-CM

## 2024-01-25 PROCEDURE — RXMED WILLOW AMBULATORY MEDICATION CHARGE

## 2024-01-25 RX ORDER — DEXTROAMPHETAMINE SACCHARATE, AMPHETAMINE ASPARTATE MONOHYDRATE, DEXTROAMPHETAMINE SULFATE AND AMPHETAMINE SULFATE 7.5; 7.5; 7.5; 7.5 MG/1; MG/1; MG/1; MG/1
30 CAPSULE, EXTENDED RELEASE ORAL EVERY MORNING
Qty: 30 CAPSULE | Refills: 0 | Status: SHIPPED | OUTPATIENT
Start: 2024-01-25 | End: 2024-02-21 | Stop reason: SDUPTHER

## 2024-01-25 NOTE — TELEPHONE ENCOUNTER
Rx Refill Request Telephone Encounter    Name:  Dale Schrader  :  [id number]  Medication Name:      amphetamine-dextroamphetamine XR (Adderall XR) 30 mg 24 hr capsule [[id number]]  1 cap taken in the morning                Specific Pharmacy location:  [facility]  Date of last appointment:  2023  Date of next appointment:  2024  Best number to reach patient:  [phone]

## 2024-01-26 ENCOUNTER — PHARMACY VISIT (OUTPATIENT)
Dept: PHARMACY | Facility: CLINIC | Age: 74
End: 2024-01-26
Payer: MEDICARE

## 2024-01-30 PROCEDURE — RXMED WILLOW AMBULATORY MEDICATION CHARGE

## 2024-02-05 PROCEDURE — RXMED WILLOW AMBULATORY MEDICATION CHARGE

## 2024-02-15 ENCOUNTER — PHARMACY VISIT (OUTPATIENT)
Dept: PHARMACY | Facility: CLINIC | Age: 74
End: 2024-02-15
Payer: MEDICARE

## 2024-02-21 ENCOUNTER — TELEPHONE (OUTPATIENT)
Dept: PRIMARY CARE | Facility: CLINIC | Age: 74
End: 2024-02-21
Payer: MEDICARE

## 2024-02-21 DIAGNOSIS — G47.00 INSOMNIA, UNSPECIFIED TYPE: ICD-10-CM

## 2024-02-21 DIAGNOSIS — F90.9 ATTENTION DEFICIT HYPERACTIVITY DISORDER (ADHD), UNSPECIFIED ADHD TYPE: ICD-10-CM

## 2024-02-21 PROCEDURE — RXMED WILLOW AMBULATORY MEDICATION CHARGE

## 2024-02-21 RX ORDER — ZOLPIDEM TARTRATE 10 MG/1
10 TABLET ORAL NIGHTLY PRN
Qty: 30 TABLET | Refills: 2 | Status: SHIPPED | OUTPATIENT
Start: 2024-02-21 | End: 2024-05-16 | Stop reason: SDUPTHER

## 2024-02-21 RX ORDER — DEXTROAMPHETAMINE SACCHARATE, AMPHETAMINE ASPARTATE MONOHYDRATE, DEXTROAMPHETAMINE SULFATE AND AMPHETAMINE SULFATE 7.5; 7.5; 7.5; 7.5 MG/1; MG/1; MG/1; MG/1
30 CAPSULE, EXTENDED RELEASE ORAL EVERY MORNING
Qty: 30 CAPSULE | Refills: 0 | Status: SHIPPED | OUTPATIENT
Start: 2024-02-21 | End: 2024-03-27 | Stop reason: SDUPTHER

## 2024-02-21 NOTE — TELEPHONE ENCOUNTER
Rx Refill Request Telephone Encounter    Name:  Dale Schrader  :  [id number]  Medication Name:      amphetamine-dextroamphetamine XR (Adderall XR) 30 mg 24 hr capsule   Route: TAKE 1 CAPSULE (30 MG) BY MOUTH ONCE DAILY IN THE MORNING    zolpidem (Ambien) 10 mg tablet   Route: TAKE 1 TABLET BY MOUTH AT BEDTIME AS NEEDED FOR SLEEP      Specific Pharmacy location:  [facility]   Date of last appointment:  23  Date of next appointment:  3/14/24  Best number to reach patient:  [phone]

## 2024-02-23 PROCEDURE — RXMED WILLOW AMBULATORY MEDICATION CHARGE

## 2024-02-24 ENCOUNTER — PHARMACY VISIT (OUTPATIENT)
Dept: PHARMACY | Facility: CLINIC | Age: 74
End: 2024-02-24
Payer: MEDICARE

## 2024-03-13 ENCOUNTER — LAB (OUTPATIENT)
Dept: LAB | Facility: LAB | Age: 74
End: 2024-03-13
Payer: MEDICARE

## 2024-03-13 ENCOUNTER — HOSPITAL ENCOUNTER (OUTPATIENT)
Dept: RADIOLOGY | Facility: HOSPITAL | Age: 74
Discharge: HOME | End: 2024-03-13
Payer: MEDICARE

## 2024-03-13 DIAGNOSIS — N20.0 RENAL CALCULI: ICD-10-CM

## 2024-03-13 DIAGNOSIS — I10 BENIGN ESSENTIAL HYPERTENSION: ICD-10-CM

## 2024-03-13 DIAGNOSIS — Z79.4 TYPE 2 DIABETES MELLITUS WITH HYPERGLYCEMIA, WITH LONG-TERM CURRENT USE OF INSULIN (MULTI): ICD-10-CM

## 2024-03-13 DIAGNOSIS — E78.2 MIXED HYPERLIPIDEMIA: ICD-10-CM

## 2024-03-13 DIAGNOSIS — E11.65 TYPE 2 DIABETES MELLITUS WITH HYPERGLYCEMIA, WITH LONG-TERM CURRENT USE OF INSULIN (MULTI): ICD-10-CM

## 2024-03-13 LAB
ALBUMIN SERPL BCP-MCNC: 4.5 G/DL (ref 3.4–5)
ALP SERPL-CCNC: 51 U/L (ref 33–136)
ALT SERPL W P-5'-P-CCNC: 26 U/L (ref 10–52)
ANION GAP SERPL CALC-SCNC: 14 MMOL/L (ref 10–20)
AST SERPL W P-5'-P-CCNC: 27 U/L (ref 9–39)
BILIRUB SERPL-MCNC: 0.6 MG/DL (ref 0–1.2)
BUN SERPL-MCNC: 18 MG/DL (ref 6–23)
CALCIUM SERPL-MCNC: 9.2 MG/DL (ref 8.6–10.3)
CHLORIDE SERPL-SCNC: 101 MMOL/L (ref 98–107)
CHOLEST SERPL-MCNC: 121 MG/DL (ref 0–199)
CHOLESTEROL/HDL RATIO: 2.7
CO2 SERPL-SCNC: 26 MMOL/L (ref 21–32)
CREAT SERPL-MCNC: 1.09 MG/DL (ref 0.5–1.3)
EGFRCR SERPLBLD CKD-EPI 2021: 73 ML/MIN/1.73M*2
EST. AVERAGE GLUCOSE BLD GHB EST-MCNC: 146 MG/DL
GLUCOSE SERPL-MCNC: 94 MG/DL (ref 74–99)
HBA1C MFR BLD: 6.7 %
HDLC SERPL-MCNC: 45 MG/DL
LDLC SERPL CALC-MCNC: 52 MG/DL
LDLC SERPL DIRECT ASSAY-MCNC: 66 MG/DL (ref 0–129)
NON HDL CHOLESTEROL: 76 MG/DL (ref 0–149)
POTASSIUM SERPL-SCNC: 4.7 MMOL/L (ref 3.5–5.3)
PROT SERPL-MCNC: 7.5 G/DL (ref 6.4–8.2)
SODIUM SERPL-SCNC: 136 MMOL/L (ref 136–145)
TRIGL SERPL-MCNC: 118 MG/DL (ref 0–149)
VLDL: 24 MG/DL (ref 0–40)

## 2024-03-13 PROCEDURE — 83036 HEMOGLOBIN GLYCOSYLATED A1C: CPT

## 2024-03-13 PROCEDURE — 74019 RADEX ABDOMEN 2 VIEWS: CPT

## 2024-03-13 PROCEDURE — 74019 RADEX ABDOMEN 2 VIEWS: CPT | Performed by: RADIOLOGY

## 2024-03-13 PROCEDURE — 80053 COMPREHEN METABOLIC PANEL: CPT

## 2024-03-13 PROCEDURE — 80061 LIPID PANEL: CPT

## 2024-03-13 PROCEDURE — 36415 COLL VENOUS BLD VENIPUNCTURE: CPT

## 2024-03-13 PROCEDURE — 83721 ASSAY OF BLOOD LIPOPROTEIN: CPT

## 2024-03-14 ENCOUNTER — OFFICE VISIT (OUTPATIENT)
Dept: PRIMARY CARE | Facility: CLINIC | Age: 74
End: 2024-03-14
Payer: MEDICARE

## 2024-03-14 VITALS
SYSTOLIC BLOOD PRESSURE: 146 MMHG | DIASTOLIC BLOOD PRESSURE: 73 MMHG | BODY MASS INDEX: 27.98 KG/M2 | OXYGEN SATURATION: 96 % | RESPIRATION RATE: 14 BRPM | HEART RATE: 82 BPM | TEMPERATURE: 96.9 F | WEIGHT: 237 LBS | HEIGHT: 77 IN

## 2024-03-14 DIAGNOSIS — Z79.899 CONTROLLED SUBSTANCE AGREEMENT SIGNED: ICD-10-CM

## 2024-03-14 DIAGNOSIS — F90.0 ATTENTION DEFICIT HYPERACTIVITY DISORDER (ADHD), PREDOMINANTLY INATTENTIVE TYPE: ICD-10-CM

## 2024-03-14 DIAGNOSIS — Z79.4 TYPE 2 DIABETES MELLITUS WITH HYPERGLYCEMIA, WITH LONG-TERM CURRENT USE OF INSULIN (MULTI): Primary | ICD-10-CM

## 2024-03-14 DIAGNOSIS — E78.2 MIXED HYPERLIPIDEMIA: ICD-10-CM

## 2024-03-14 DIAGNOSIS — E11.65 TYPE 2 DIABETES MELLITUS WITH HYPERGLYCEMIA, WITH LONG-TERM CURRENT USE OF INSULIN (MULTI): Primary | ICD-10-CM

## 2024-03-14 DIAGNOSIS — I10 BENIGN ESSENTIAL HYPERTENSION: ICD-10-CM

## 2024-03-14 DIAGNOSIS — G47.00 INSOMNIA, UNSPECIFIED TYPE: ICD-10-CM

## 2024-03-14 PROBLEM — Z86.006 HISTORY OF MELANOMA IN SITU: Status: ACTIVE | Noted: 2024-03-14

## 2024-03-14 PROBLEM — D03.9 MELANOMA IN SITU OF THE SKIN (MULTI): Status: RESOLVED | Noted: 2023-01-31 | Resolved: 2024-03-14

## 2024-03-14 PROCEDURE — 1036F TOBACCO NON-USER: CPT | Performed by: FAMILY MEDICINE

## 2024-03-14 PROCEDURE — 80305 DRUG TEST PRSMV DIR OPT OBS: CPT | Performed by: FAMILY MEDICINE

## 2024-03-14 PROCEDURE — 3077F SYST BP >= 140 MM HG: CPT | Performed by: FAMILY MEDICINE

## 2024-03-14 PROCEDURE — 1170F FXNL STATUS ASSESSED: CPT | Performed by: FAMILY MEDICINE

## 2024-03-14 PROCEDURE — 3048F LDL-C <100 MG/DL: CPT | Performed by: FAMILY MEDICINE

## 2024-03-14 PROCEDURE — 3044F HG A1C LEVEL LT 7.0%: CPT | Performed by: FAMILY MEDICINE

## 2024-03-14 PROCEDURE — 99214 OFFICE O/P EST MOD 30 MIN: CPT | Performed by: FAMILY MEDICINE

## 2024-03-14 PROCEDURE — 1124F ACP DISCUSS-NO DSCNMKR DOCD: CPT | Performed by: FAMILY MEDICINE

## 2024-03-14 PROCEDURE — 4010F ACE/ARB THERAPY RXD/TAKEN: CPT | Performed by: FAMILY MEDICINE

## 2024-03-14 PROCEDURE — 1160F RVW MEDS BY RX/DR IN RCRD: CPT | Performed by: FAMILY MEDICINE

## 2024-03-14 PROCEDURE — 1159F MED LIST DOCD IN RCRD: CPT | Performed by: FAMILY MEDICINE

## 2024-03-14 PROCEDURE — 3078F DIAST BP <80 MM HG: CPT | Performed by: FAMILY MEDICINE

## 2024-03-14 RX ORDER — METOPROLOL SUCCINATE 25 MG/1
TABLET, EXTENDED RELEASE ORAL
Qty: 90 TABLET | Refills: 1 | Status: SHIPPED | OUTPATIENT
Start: 2024-03-14 | End: 2025-03-14

## 2024-03-14 ASSESSMENT — ENCOUNTER SYMPTOMS
ARTHRALGIAS: 0
ABDOMINAL PAIN: 0
FEVER: 0
PALPITATIONS: 0
SHORTNESS OF BREATH: 0
CHILLS: 0
CONFUSION: 0
CHEST TIGHTNESS: 0

## 2024-03-14 ASSESSMENT — ACTIVITIES OF DAILY LIVING (ADL)
GROCERY_SHOPPING: INDEPENDENT
TAKING_MEDICATION: INDEPENDENT
BATHING: INDEPENDENT
MANAGING_FINANCES: INDEPENDENT
DRESSING: INDEPENDENT
DOING_HOUSEWORK: INDEPENDENT

## 2024-03-14 ASSESSMENT — PATIENT HEALTH QUESTIONNAIRE - PHQ9
1. LITTLE INTEREST OR PLEASURE IN DOING THINGS: NOT AT ALL
2. FEELING DOWN, DEPRESSED OR HOPELESS: NOT AT ALL
SUM OF ALL RESPONSES TO PHQ9 QUESTIONS 1 AND 2: 0

## 2024-03-14 NOTE — ASSESSMENT & PLAN NOTE
A1c 6.7% continue current treatment and dietary modification    She states he is up-to-date with diabetic eye exam

## 2024-03-14 NOTE — ASSESSMENT & PLAN NOTE
Blood pressure slightly elevated but patient did not take medication this morning.  He will continue current antihypertensive medication keep home blood pressure log call us with results in 1 week

## 2024-03-14 NOTE — PROGRESS NOTES
"Subjective   Patient ID: Dale Schrader is a 73 y.o. male who presents for Follow-up (4 MONTH/CSA andUDS) and Medicare Annual Wellness Visit Subsequent.    HPI   Patient today for follow-up of ongoing healthcare issues review of recent lab work he says he missed taking his morning medications.  He just had his x-ray done yesterday.  Says he needs to reschedule the orthopedic referral that was ordered at last visit.  Otherwise states he has been doing okay without any other major new acute issues.  Review of Systems   Constitutional:  Negative for chills and fever.   HENT:  Negative for congestion and ear pain.    Eyes:  Negative for visual disturbance.   Respiratory:  Negative for chest tightness and shortness of breath.    Cardiovascular:  Negative for chest pain and palpitations.   Gastrointestinal:  Negative for abdominal pain.   Musculoskeletal:  Negative for arthralgias.   Skin:  Negative for pallor.   Psychiatric/Behavioral:  Negative for confusion.        Objective   /73   Pulse 82   Temp 36.1 °C (96.9 °F)   Resp 14   Ht 1.956 m (6' 5\")   Wt 108 kg (237 lb)   SpO2 96%   BMI 28.10 kg/m²     Physical Exam  Vitals and nursing note reviewed.   Constitutional:       General: He is not in acute distress.     Appearance: Normal appearance. He is not ill-appearing.   HENT:      Head: Normocephalic and atraumatic.      Right Ear: Tympanic membrane, ear canal and external ear normal.      Left Ear: Tympanic membrane, ear canal and external ear normal.      Mouth/Throat:      Pharynx: Oropharynx is clear.   Eyes:      Extraocular Movements: Extraocular movements intact.   Cardiovascular:      Rate and Rhythm: Normal rate and regular rhythm.      Pulses: Normal pulses.      Heart sounds: Normal heart sounds.   Pulmonary:      Effort: Pulmonary effort is normal.      Breath sounds: Normal breath sounds.   Abdominal:      General: Abdomen is flat. Bowel sounds are normal.      Palpations: Abdomen is soft. "      Tenderness: There is no abdominal tenderness.   Musculoskeletal:         General: Normal range of motion.      Cervical back: Neck supple.   Skin:     General: Skin is warm.   Neurological:      Mental Status: He is alert and oriented to person, place, and time. Mental status is at baseline.   Psychiatric:         Mood and Affect: Mood normal.       Recent Results (from the past 1008 hour(s))   Cholesterol, LDL Direct    Collection Time: 03/13/24 11:03 AM   Result Value Ref Range    LDL, Direct 66 0 - 129 mg/dL   Comprehensive Metabolic Panel    Collection Time: 03/13/24 11:03 AM   Result Value Ref Range    Glucose 94 74 - 99 mg/dL    Sodium 136 136 - 145 mmol/L    Potassium 4.7 3.5 - 5.3 mmol/L    Chloride 101 98 - 107 mmol/L    Bicarbonate 26 21 - 32 mmol/L    Anion Gap 14 10 - 20 mmol/L    Urea Nitrogen 18 6 - 23 mg/dL    Creatinine 1.09 0.50 - 1.30 mg/dL    eGFR 73 >60 mL/min/1.73m*2    Calcium 9.2 8.6 - 10.3 mg/dL    Albumin 4.5 3.4 - 5.0 g/dL    Alkaline Phosphatase 51 33 - 136 U/L    Total Protein 7.5 6.4 - 8.2 g/dL    AST 27 9 - 39 U/L    Bilirubin, Total 0.6 0.0 - 1.2 mg/dL    ALT 26 10 - 52 U/L   Hemoglobin A1C    Collection Time: 03/13/24 11:03 AM   Result Value Ref Range    Hemoglobin A1C 6.7 (H) see below %    Estimated Average Glucose 146 Not Established mg/dL   Lipid Panel    Collection Time: 03/13/24 11:03 AM   Result Value Ref Range    Cholesterol 121 0 - 199 mg/dL    HDL-Cholesterol 45.0 mg/dL    Cholesterol/HDL Ratio 2.7     LDL Calculated 52 <=99 mg/dL    VLDL 24 0 - 40 mg/dL    Triglycerides 118 0 - 149 mg/dL    Non HDL Cholesterol 76 0 - 149 mg/dL     Recent labs reviewed with patient    Continue current medications follow low-fat low-cholesterol diabetic diet    Blood pressure not quite to goal possibly due to missed medication we encourage better compliance we will continue with current meds keep home blood pressure log call with results in about a week    Controlled substance agreement  signed  Urine drug screen completed    Return to office 4 months with repeat fasting labs      Assessment/Plan   Problem List Items Addressed This Visit             ICD-10-CM    ADHD F90.9     Stable continue Adderall XR 30 mg daily         Relevant Orders    POCT waived urine drug screen manually resulted    Follow Up In Primary Care - Established    Benign essential hypertension I10     Blood pressure slightly elevated but patient did not take medication this morning.  He will continue current antihypertensive medication keep home blood pressure log call us with results in 1 week         Relevant Medications    metoprolol succinate XL (Toprol-XL) 25 mg 24 hr tablet    Other Relevant Orders    Comprehensive Metabolic Panel    Follow Up In Primary Care - Established    Hyperlipidemia E78.5     Continue simvastatin 10 mg daily         Relevant Orders    Comprehensive Metabolic Panel    Lipid Panel    Follow Up In Primary Care - Established    Insomnia G47.00     Continue Ambien 10 mg at at bedtime         Relevant Orders    POCT waived urine drug screen manually resulted    Type 2 diabetes mellitus with hyperglycemia, with long-term current use of insulin (CMS/[facility]) - Primary E11.65, Z79.4     A1c 6.7% continue current treatment and dietary modification    She states he is up-to-date with diabetic eye exam         Relevant Orders    Comprehensive Metabolic Panel    Hemoglobin A1C    Albumin , Urine Random    Follow Up In Primary Care - Established    Controlled substance agreement signed Z79.899     Urine drug screen    I have personally reviewed the OARRS report.  I have considered the risks of abuse,dependence, addiction and diversion.  I believe it is clinically appropriate to prescribe this medication.            Relevant Orders    POCT waived urine drug screen manually resulted

## 2024-03-14 NOTE — ASSESSMENT & PLAN NOTE
Urine drug screen    I have personally reviewed the OARRS report.  I have considered the risks of abuse,dependence, addiction and diversion.  I believe it is clinically appropriate to prescribe this medication.

## 2024-03-20 ENCOUNTER — PHARMACY VISIT (OUTPATIENT)
Dept: PHARMACY | Facility: CLINIC | Age: 74
End: 2024-03-20
Payer: MEDICARE

## 2024-03-20 PROCEDURE — RXMED WILLOW AMBULATORY MEDICATION CHARGE

## 2024-03-21 ENCOUNTER — TELEPHONE (OUTPATIENT)
Dept: ORTHOPEDIC SURGERY | Facility: HOSPITAL | Age: 74
End: 2024-03-21
Payer: MEDICARE

## 2024-03-21 PROCEDURE — RXMED WILLOW AMBULATORY MEDICATION CHARGE

## 2024-03-21 NOTE — TELEPHONE ENCOUNTER
Called patient at the request of Dr. Shaw to get him rescheduled for a joint doctor or a possible injection with Asya due to his arthritis.  I spoke with patient and explained this to him.  He elected to schedule with Asya for a possible injection and updated x-rays to see the status of his arthritis. He was scheduled for 4/10/24 at 1:30pm. CT   LOV with PCP 8/16/16.  Order placed.   Routed to MD as fYI

## 2024-03-27 ENCOUNTER — APPOINTMENT (OUTPATIENT)
Dept: ORTHOPEDIC SURGERY | Facility: HOSPITAL | Age: 74
End: 2024-03-27
Payer: MEDICARE

## 2024-03-27 ENCOUNTER — TELEPHONE (OUTPATIENT)
Dept: PRIMARY CARE | Facility: CLINIC | Age: 74
End: 2024-03-27
Payer: MEDICARE

## 2024-03-27 DIAGNOSIS — F90.9 ATTENTION DEFICIT HYPERACTIVITY DISORDER (ADHD), UNSPECIFIED ADHD TYPE: ICD-10-CM

## 2024-03-27 PROCEDURE — RXMED WILLOW AMBULATORY MEDICATION CHARGE

## 2024-03-27 RX ORDER — DEXTROAMPHETAMINE SACCHARATE, AMPHETAMINE ASPARTATE MONOHYDRATE, DEXTROAMPHETAMINE SULFATE AND AMPHETAMINE SULFATE 7.5; 7.5; 7.5; 7.5 MG/1; MG/1; MG/1; MG/1
30 CAPSULE, EXTENDED RELEASE ORAL EVERY MORNING
Qty: 30 CAPSULE | Refills: 0 | Status: SHIPPED | OUTPATIENT
Start: 2024-03-27 | End: 2024-04-25 | Stop reason: SDUPTHER

## 2024-03-27 NOTE — TELEPHONE ENCOUNTER
Rx Refill Request Telephone Encounter    Name:  Dale Schrader  :  [id number]  Medication Name:      amphetamine-dextroamphetamine XR (Adderall XR) 30 mg 24 hr capsule   Route: TAKE 1 CAPSULE (30 MG) BY MOUTH ONCE DAILY IN THE MORNING.       Specific Pharmacy location:  [facility]   Date of last appointment:  3/14/24  Date of next appointment:  24  Best number to reach patient: [phone]

## 2024-03-30 ENCOUNTER — PHARMACY VISIT (OUTPATIENT)
Dept: PHARMACY | Facility: CLINIC | Age: 74
End: 2024-03-30
Payer: MEDICARE

## 2024-04-10 ENCOUNTER — OFFICE VISIT (OUTPATIENT)
Dept: ORTHOPEDIC SURGERY | Facility: HOSPITAL | Age: 74
End: 2024-04-10
Payer: MEDICARE

## 2024-04-10 ENCOUNTER — HOSPITAL ENCOUNTER (OUTPATIENT)
Dept: RADIOLOGY | Facility: HOSPITAL | Age: 74
Discharge: HOME | End: 2024-04-10
Payer: MEDICARE

## 2024-04-10 ENCOUNTER — HOSPITAL ENCOUNTER (OUTPATIENT)
Dept: RADIOLOGY | Facility: EXTERNAL LOCATION | Age: 74
Discharge: HOME | End: 2024-04-10

## 2024-04-10 DIAGNOSIS — M25.551 RIGHT HIP PAIN: ICD-10-CM

## 2024-04-10 DIAGNOSIS — M25.551 RIGHT HIP PAIN: Primary | ICD-10-CM

## 2024-04-10 DIAGNOSIS — M16.10 ARTHRITIS, HIP: ICD-10-CM

## 2024-04-10 PROCEDURE — 3048F LDL-C <100 MG/DL: CPT | Performed by: PHYSICIAN ASSISTANT

## 2024-04-10 PROCEDURE — 4010F ACE/ARB THERAPY RXD/TAKEN: CPT | Performed by: PHYSICIAN ASSISTANT

## 2024-04-10 PROCEDURE — 1159F MED LIST DOCD IN RCRD: CPT | Performed by: PHYSICIAN ASSISTANT

## 2024-04-10 PROCEDURE — 1160F RVW MEDS BY RX/DR IN RCRD: CPT | Performed by: PHYSICIAN ASSISTANT

## 2024-04-10 PROCEDURE — 3044F HG A1C LEVEL LT 7.0%: CPT | Performed by: PHYSICIAN ASSISTANT

## 2024-04-10 PROCEDURE — 99203 OFFICE O/P NEW LOW 30 MIN: CPT | Performed by: PHYSICIAN ASSISTANT

## 2024-04-10 PROCEDURE — 99213 OFFICE O/P EST LOW 20 MIN: CPT | Performed by: PHYSICIAN ASSISTANT

## 2024-04-10 PROCEDURE — 73502 X-RAY EXAM HIP UNI 2-3 VIEWS: CPT | Mod: RT

## 2024-04-10 PROCEDURE — 1123F ACP DISCUSS/DSCN MKR DOCD: CPT | Performed by: PHYSICIAN ASSISTANT

## 2024-04-10 PROCEDURE — 73502 X-RAY EXAM HIP UNI 2-3 VIEWS: CPT | Mod: RIGHT SIDE | Performed by: RADIOLOGY

## 2024-04-10 NOTE — PROGRESS NOTES
Right hip    HPI  This is a pleasant 73 y.o. male here today for right hip pain.   He has had this pain for many years.  He actually saw Dr. Shaw back in 2019.  He has osteoarthritis.  And in the groin that has continued to get worse over time. He locates the pain in the groin. He is here for further evaluation.        Past Medical History:   Diagnosis Date    ADHD (attention deficit hyperactivity disorder)     Diabetes type 2, controlled (Multi)     Hypertension     Melanoma in situ of the skin (Multi) 01/31/2023    Other long term (current) drug therapy     Controlled substance agreement signed    Personal history of malignant melanoma of skin     History of malignant melanoma of skin    Sleep apnea     Unspecified injury of right hip, initial encounter 09/18/2019    Injury of hip, right       Past Surgical History:   Procedure Laterality Date    OTHER SURGICAL HISTORY  10/01/2019    Mohs surgery    OTHER SURGICAL HISTORY  10/01/2019    Sinus surgery       Social History     Tobacco Use    Smoking status: Never     Passive exposure: Past    Smokeless tobacco: Never   Vaping Use    Vaping status: Never Used   Substance Use Topics    Alcohol use: Yes     Alcohol/week: 3.0 standard drinks of alcohol     Types: 1 Glasses of wine, 1 Cans of beer, 1 Shots of liquor per week    Drug use: Never          ROS  Review of systems reviewed and pertinent positives mentioned in HPI.      PHYSICAL EXAM  This is a pleasant gentleman no acute distress.  He is alert and oriented x 3.  Limbs are warm and well-perfused.  Pain reproduced with rotation of the hip.    IMAGING  X-rays reviewed reveal no gross fracture or dislocation. and severe degenerative changes noted.    MRI reviewed reveals No MRI available for review      ASSESSMENT  Right  osteoarthritis      PLAN  This is a 73 y.o. male patient here today with significant hip pain.  We proceeded with the injection as below.      Patient ID: Dale Schrader is a 73 y.o.  male.    L Inj/Asp: R hip joint on 4/15/2024 12:35 PM  Indications: pain  Details: 20 G needle, ultrasound-guided anterior approach  Medications: 40 mg methylPREDNISolone acetate 40 mg/mL; 4 mL lidocaine 10 mg/mL (1 %)    After consent was obtained, the anterior right hip was prepped in a sterile fashion. Ultrasound guidance was used to help insure proper needle placement into the hip joint, decrease patient discomfort, and decrease collateral damage. The joint was aspirated and Depo 40 mg with lidocaine 4cc were injected without any complications. Ultrasound images were saved on an internal file for later reference. The patient tolerated the procedure well and the area was cleaned and bandaged.    Procedure, treatment alternatives, risks and benefits explained, specific risks discussed. Consent was given by the patient. Immediately prior to procedure a time out was called to verify the correct patient, procedure, equipment, support staff and site/side marked as required. Patient was prepped and draped in the usual sterile fashion.             Asya Hooks PA-C

## 2024-04-15 PROCEDURE — 2500000005 HC RX 250 GENERAL PHARMACY W/O HCPCS: Performed by: PHYSICIAN ASSISTANT

## 2024-04-15 PROCEDURE — 2500000004 HC RX 250 GENERAL PHARMACY W/ HCPCS (ALT 636 FOR OP/ED): Performed by: PHYSICIAN ASSISTANT

## 2024-04-15 PROCEDURE — 20611 DRAIN/INJ JOINT/BURSA W/US: CPT | Performed by: PHYSICIAN ASSISTANT

## 2024-04-15 RX ORDER — LIDOCAINE HYDROCHLORIDE 10 MG/ML
4 INJECTION INFILTRATION; PERINEURAL
Status: COMPLETED | OUTPATIENT
Start: 2024-04-15 | End: 2024-04-15

## 2024-04-15 RX ORDER — METHYLPREDNISOLONE ACETATE 40 MG/ML
40 INJECTION, SUSPENSION INTRA-ARTICULAR; INTRALESIONAL; INTRAMUSCULAR; SOFT TISSUE
Status: COMPLETED | OUTPATIENT
Start: 2024-04-15 | End: 2024-04-15

## 2024-04-15 RX ADMIN — METHYLPREDNISOLONE ACETATE 40 MG: 40 INJECTION, SUSPENSION INTRA-ARTICULAR; INTRALESIONAL; INTRAMUSCULAR; INTRASYNOVIAL; SOFT TISSUE at 12:35

## 2024-04-15 RX ADMIN — LIDOCAINE HYDROCHLORIDE 4 ML: 10 INJECTION, SOLUTION INFILTRATION; PERINEURAL at 12:35

## 2024-04-18 ENCOUNTER — PHARMACY VISIT (OUTPATIENT)
Dept: PHARMACY | Facility: CLINIC | Age: 74
End: 2024-04-18
Payer: MEDICARE

## 2024-04-18 PROCEDURE — RXMED WILLOW AMBULATORY MEDICATION CHARGE

## 2024-04-25 ENCOUNTER — TELEPHONE (OUTPATIENT)
Dept: PRIMARY CARE | Facility: CLINIC | Age: 74
End: 2024-04-25
Payer: MEDICARE

## 2024-04-25 DIAGNOSIS — F90.9 ATTENTION DEFICIT HYPERACTIVITY DISORDER (ADHD), UNSPECIFIED ADHD TYPE: ICD-10-CM

## 2024-04-25 PROCEDURE — RXMED WILLOW AMBULATORY MEDICATION CHARGE

## 2024-04-25 RX ORDER — DEXTROAMPHETAMINE SACCHARATE, AMPHETAMINE ASPARTATE MONOHYDRATE, DEXTROAMPHETAMINE SULFATE AND AMPHETAMINE SULFATE 7.5; 7.5; 7.5; 7.5 MG/1; MG/1; MG/1; MG/1
30 CAPSULE, EXTENDED RELEASE ORAL EVERY MORNING
Qty: 30 CAPSULE | Refills: 0 | Status: SHIPPED | OUTPATIENT
Start: 2024-04-25 | End: 2024-05-28 | Stop reason: SDUPTHER

## 2024-04-25 NOTE — TELEPHONE ENCOUNTER
Rx Refill Request Telephone Encounter    Name:  Dale Schrader  :  [id number]  Medication Name:  amphetamine-dextroamphetamine XR (Adderall XR) 30 mg 24 hr capsule        TAKE 1 CAPSULE (30 MG) BY MOUTH ONCE DAILY IN THE MORNING.   30    Specific Pharmacy location:  [facility]  Date of last appointment:  3/14  Date of next appointment:    Best number to reach patient:  [phone]

## 2024-04-29 ENCOUNTER — PHARMACY VISIT (OUTPATIENT)
Dept: PHARMACY | Facility: CLINIC | Age: 74
End: 2024-04-29
Payer: MEDICARE

## 2024-05-08 PROCEDURE — RXMED WILLOW AMBULATORY MEDICATION CHARGE

## 2024-05-15 ENCOUNTER — PHARMACY VISIT (OUTPATIENT)
Dept: PHARMACY | Facility: CLINIC | Age: 74
End: 2024-05-15
Payer: MEDICARE

## 2024-05-16 ENCOUNTER — TELEPHONE (OUTPATIENT)
Dept: PRIMARY CARE | Facility: CLINIC | Age: 74
End: 2024-05-16
Payer: MEDICARE

## 2024-05-16 DIAGNOSIS — G47.00 INSOMNIA, UNSPECIFIED TYPE: ICD-10-CM

## 2024-05-16 DIAGNOSIS — E78.2 MIXED HYPERLIPIDEMIA: ICD-10-CM

## 2024-05-16 PROCEDURE — RXMED WILLOW AMBULATORY MEDICATION CHARGE

## 2024-05-16 RX ORDER — ZOLPIDEM TARTRATE 10 MG/1
10 TABLET ORAL NIGHTLY PRN
Qty: 30 TABLET | Refills: 2 | Status: SHIPPED | OUTPATIENT
Start: 2024-05-16 | End: 2024-11-16

## 2024-05-16 RX ORDER — SIMVASTATIN 10 MG/1
10 TABLET, FILM COATED ORAL EVERY EVENING
Qty: 90 TABLET | Refills: 3 | Status: SHIPPED | OUTPATIENT
Start: 2024-05-16

## 2024-05-16 NOTE — TELEPHONE ENCOUNTER
Rx Refill Request Telephone Encounter    Name:  Dale Schrader  :  [id number]  Medication Name:   Patient said that the refills  on this med   simvastatin (Zocor) 10 mg tablet  Route: Take 1 tablet (10 mg) by mouth once daily in the evening.       zolpidem (Ambien) 10 mg tablet   Route: TAKE 1 TABLET BY MOUTH AT BEDTIME AS NEEDED FOR SLEEP     Specific Pharmacy location:  [facility]   Date of last appointment:  3/14/24  Date of next appointment:  24  Best number to reach patient: [phone]

## 2024-05-17 ENCOUNTER — PHARMACY VISIT (OUTPATIENT)
Dept: PHARMACY | Facility: CLINIC | Age: 74
End: 2024-05-17
Payer: MEDICARE

## 2024-05-28 ENCOUNTER — TELEPHONE (OUTPATIENT)
Dept: PRIMARY CARE | Facility: CLINIC | Age: 74
End: 2024-05-28
Payer: MEDICARE

## 2024-05-28 DIAGNOSIS — F90.9 ATTENTION DEFICIT HYPERACTIVITY DISORDER (ADHD), UNSPECIFIED ADHD TYPE: ICD-10-CM

## 2024-05-28 PROCEDURE — RXMED WILLOW AMBULATORY MEDICATION CHARGE

## 2024-05-28 RX ORDER — DEXTROAMPHETAMINE SACCHARATE, AMPHETAMINE ASPARTATE MONOHYDRATE, DEXTROAMPHETAMINE SULFATE AND AMPHETAMINE SULFATE 7.5; 7.5; 7.5; 7.5 MG/1; MG/1; MG/1; MG/1
30 CAPSULE, EXTENDED RELEASE ORAL EVERY MORNING
Qty: 30 CAPSULE | Refills: 0 | Status: SHIPPED | OUTPATIENT
Start: 2024-05-28 | End: 2024-06-29

## 2024-05-28 NOTE — TELEPHONE ENCOUNTER
Rx Refill Request Telephone Encounter    Name:  Dale Schrader  :  [id number]  Medication Name:  amphetamine-dextroamphetamine XR (Adderall XR) 30 mg 24 hr capsule       TAKE 1 CAPSULE (30 MG) BY MOUTH ONCE DAILY IN THE MORNING.   30    [place]  [facility] [place] [place]   Date of last appointment:  3/14  Date of next appointment:    Best number to reach patient:  [phone]

## 2024-05-30 ENCOUNTER — PHARMACY VISIT (OUTPATIENT)
Dept: PHARMACY | Facility: CLINIC | Age: 74
End: 2024-05-30
Payer: MEDICARE

## 2024-06-18 PROCEDURE — RXMED WILLOW AMBULATORY MEDICATION CHARGE

## 2024-06-21 PROCEDURE — RXMED WILLOW AMBULATORY MEDICATION CHARGE

## 2024-06-27 ENCOUNTER — PHARMACY VISIT (OUTPATIENT)
Dept: PHARMACY | Facility: CLINIC | Age: 74
End: 2024-06-27
Payer: MEDICARE

## 2024-06-27 ENCOUNTER — TELEPHONE (OUTPATIENT)
Dept: PRIMARY CARE | Facility: CLINIC | Age: 74
End: 2024-06-27
Payer: MEDICARE

## 2024-06-27 DIAGNOSIS — F90.9 ATTENTION DEFICIT HYPERACTIVITY DISORDER (ADHD), UNSPECIFIED ADHD TYPE: ICD-10-CM

## 2024-06-27 PROCEDURE — RXMED WILLOW AMBULATORY MEDICATION CHARGE

## 2024-06-27 RX ORDER — DEXTROAMPHETAMINE SACCHARATE, AMPHETAMINE ASPARTATE MONOHYDRATE, DEXTROAMPHETAMINE SULFATE AND AMPHETAMINE SULFATE 7.5; 7.5; 7.5; 7.5 MG/1; MG/1; MG/1; MG/1
30 CAPSULE, EXTENDED RELEASE ORAL EVERY MORNING
Qty: 30 CAPSULE | Refills: 0 | Status: SHIPPED | OUTPATIENT
Start: 2024-06-27 | End: 2024-07-27

## 2024-06-27 NOTE — TELEPHONE ENCOUNTER
Rx Refill Request Telephone Encounter    Name:  Dale Schrader  :  [id number]  Medication Name:      amphetamine-dextroamphetamine XR (Adderall XR) 30 mg 24 hr capsule   Route: TAKE 1 CAPSULE (30 MG) BY MOUTH ONCE DAILY IN THE MORNING.      Specific Pharmacy location:  [facility]   Date of last appointment:  3/14/24  Date of next appointment:  24  Best number to reach patient: [phone]

## 2024-07-16 ENCOUNTER — LAB (OUTPATIENT)
Dept: LAB | Facility: LAB | Age: 74
End: 2024-07-16
Payer: MEDICARE

## 2024-07-16 DIAGNOSIS — Z79.4 TYPE 2 DIABETES MELLITUS WITH HYPERGLYCEMIA, WITH LONG-TERM CURRENT USE OF INSULIN (MULTI): ICD-10-CM

## 2024-07-16 DIAGNOSIS — I10 BENIGN ESSENTIAL HYPERTENSION: ICD-10-CM

## 2024-07-16 DIAGNOSIS — E78.2 MIXED HYPERLIPIDEMIA: ICD-10-CM

## 2024-07-16 DIAGNOSIS — E11.65 TYPE 2 DIABETES MELLITUS WITH HYPERGLYCEMIA, WITH LONG-TERM CURRENT USE OF INSULIN (MULTI): ICD-10-CM

## 2024-07-16 LAB
ALBUMIN SERPL BCP-MCNC: 4.5 G/DL (ref 3.4–5)
ALP SERPL-CCNC: 56 U/L (ref 33–136)
ALT SERPL W P-5'-P-CCNC: 35 U/L (ref 10–52)
ANION GAP SERPL CALC-SCNC: 14 MMOL/L (ref 10–20)
AST SERPL W P-5'-P-CCNC: 30 U/L (ref 9–39)
BILIRUB SERPL-MCNC: 0.7 MG/DL (ref 0–1.2)
BUN SERPL-MCNC: 24 MG/DL (ref 6–23)
CALCIUM SERPL-MCNC: 9.1 MG/DL (ref 8.6–10.3)
CHLORIDE SERPL-SCNC: 103 MMOL/L (ref 98–107)
CHOLEST SERPL-MCNC: 146 MG/DL (ref 0–199)
CHOLESTEROL/HDL RATIO: 3.5
CO2 SERPL-SCNC: 23 MMOL/L (ref 21–32)
CREAT SERPL-MCNC: 1.24 MG/DL (ref 0.5–1.3)
EGFRCR SERPLBLD CKD-EPI 2021: 62 ML/MIN/1.73M*2
EST. AVERAGE GLUCOSE BLD GHB EST-MCNC: 183 MG/DL
GLUCOSE SERPL-MCNC: 132 MG/DL (ref 74–99)
HBA1C MFR BLD: 8 %
HDLC SERPL-MCNC: 41.7 MG/DL
LDLC SERPL CALC-MCNC: 69 MG/DL
NON HDL CHOLESTEROL: 104 MG/DL (ref 0–149)
POTASSIUM SERPL-SCNC: 4.4 MMOL/L (ref 3.5–5.3)
PROT SERPL-MCNC: 7.3 G/DL (ref 6.4–8.2)
SODIUM SERPL-SCNC: 136 MMOL/L (ref 136–145)
TRIGL SERPL-MCNC: 179 MG/DL (ref 0–149)
VLDL: 36 MG/DL (ref 0–40)

## 2024-07-16 PROCEDURE — 83036 HEMOGLOBIN GLYCOSYLATED A1C: CPT

## 2024-07-16 PROCEDURE — 80053 COMPREHEN METABOLIC PANEL: CPT

## 2024-07-16 PROCEDURE — 36415 COLL VENOUS BLD VENIPUNCTURE: CPT

## 2024-07-16 PROCEDURE — 80061 LIPID PANEL: CPT

## 2024-07-17 ENCOUNTER — APPOINTMENT (OUTPATIENT)
Dept: PRIMARY CARE | Facility: CLINIC | Age: 74
End: 2024-07-17
Payer: MEDICARE

## 2024-07-17 VITALS
OXYGEN SATURATION: 94 % | HEART RATE: 82 BPM | HEIGHT: 77 IN | TEMPERATURE: 97.4 F | WEIGHT: 250 LBS | BODY MASS INDEX: 29.52 KG/M2 | SYSTOLIC BLOOD PRESSURE: 137 MMHG | DIASTOLIC BLOOD PRESSURE: 79 MMHG | RESPIRATION RATE: 14 BRPM

## 2024-07-17 DIAGNOSIS — Z13.29 THYROID DISORDER SCREEN: ICD-10-CM

## 2024-07-17 DIAGNOSIS — E78.2 MIXED HYPERLIPIDEMIA: ICD-10-CM

## 2024-07-17 DIAGNOSIS — E55.9 VITAMIN D DEFICIENCY: ICD-10-CM

## 2024-07-17 DIAGNOSIS — Z00.00 ANNUAL PHYSICAL EXAM: ICD-10-CM

## 2024-07-17 DIAGNOSIS — Z00.00 MEDICARE ANNUAL WELLNESS VISIT, INITIAL: Primary | ICD-10-CM

## 2024-07-17 DIAGNOSIS — F51.01 PRIMARY INSOMNIA: ICD-10-CM

## 2024-07-17 DIAGNOSIS — Z12.5 SCREENING FOR PROSTATE CANCER: ICD-10-CM

## 2024-07-17 DIAGNOSIS — I10 BENIGN ESSENTIAL HYPERTENSION: ICD-10-CM

## 2024-07-17 DIAGNOSIS — E11.65 TYPE 2 DIABETES MELLITUS WITH HYPERGLYCEMIA, WITH LONG-TERM CURRENT USE OF INSULIN (MULTI): ICD-10-CM

## 2024-07-17 DIAGNOSIS — F90.9 ATTENTION DEFICIT HYPERACTIVITY DISORDER (ADHD), UNSPECIFIED ADHD TYPE: ICD-10-CM

## 2024-07-17 DIAGNOSIS — Z79.4 TYPE 2 DIABETES MELLITUS WITH HYPERGLYCEMIA, WITH LONG-TERM CURRENT USE OF INSULIN (MULTI): ICD-10-CM

## 2024-07-17 PROCEDURE — 3078F DIAST BP <80 MM HG: CPT | Performed by: FAMILY MEDICINE

## 2024-07-17 PROCEDURE — 1170F FXNL STATUS ASSESSED: CPT | Performed by: FAMILY MEDICINE

## 2024-07-17 PROCEDURE — 3052F HG A1C>EQUAL 8.0%<EQUAL 9.0%: CPT | Performed by: FAMILY MEDICINE

## 2024-07-17 PROCEDURE — 1159F MED LIST DOCD IN RCRD: CPT | Performed by: FAMILY MEDICINE

## 2024-07-17 PROCEDURE — 1123F ACP DISCUSS/DSCN MKR DOCD: CPT | Performed by: FAMILY MEDICINE

## 2024-07-17 PROCEDURE — 3008F BODY MASS INDEX DOCD: CPT | Performed by: FAMILY MEDICINE

## 2024-07-17 PROCEDURE — 4010F ACE/ARB THERAPY RXD/TAKEN: CPT | Performed by: FAMILY MEDICINE

## 2024-07-17 PROCEDURE — 99397 PER PM REEVAL EST PAT 65+ YR: CPT | Performed by: FAMILY MEDICINE

## 2024-07-17 PROCEDURE — 1036F TOBACCO NON-USER: CPT | Performed by: FAMILY MEDICINE

## 2024-07-17 PROCEDURE — 3048F LDL-C <100 MG/DL: CPT | Performed by: FAMILY MEDICINE

## 2024-07-17 PROCEDURE — 3075F SYST BP GE 130 - 139MM HG: CPT | Performed by: FAMILY MEDICINE

## 2024-07-17 PROCEDURE — 1160F RVW MEDS BY RX/DR IN RCRD: CPT | Performed by: FAMILY MEDICINE

## 2024-07-17 PROCEDURE — G0438 PPPS, INITIAL VISIT: HCPCS | Performed by: FAMILY MEDICINE

## 2024-07-17 PROCEDURE — 99214 OFFICE O/P EST MOD 30 MIN: CPT | Performed by: FAMILY MEDICINE

## 2024-07-17 PROCEDURE — RXMED WILLOW AMBULATORY MEDICATION CHARGE

## 2024-07-17 RX ORDER — CHLORTHALIDONE 25 MG/1
25 TABLET ORAL DAILY
Qty: 90 TABLET | Refills: 3 | Status: SHIPPED | OUTPATIENT
Start: 2024-07-17 | End: 2025-07-17

## 2024-07-17 RX ORDER — FOSINOPRIL SODIUM 20 MG/1
20 TABLET ORAL DAILY
Qty: 90 TABLET | Refills: 3 | Status: SHIPPED | OUTPATIENT
Start: 2024-07-17

## 2024-07-17 RX ORDER — DEXTROAMPHETAMINE SACCHARATE, AMPHETAMINE ASPARTATE MONOHYDRATE, DEXTROAMPHETAMINE SULFATE AND AMPHETAMINE SULFATE 7.5; 7.5; 7.5; 7.5 MG/1; MG/1; MG/1; MG/1
30 CAPSULE, EXTENDED RELEASE ORAL EVERY MORNING
Qty: 30 CAPSULE | Refills: 0 | Status: CANCELLED | OUTPATIENT
Start: 2024-07-17 | End: 2024-08-16

## 2024-07-17 RX ORDER — DEXTROAMPHETAMINE SACCHARATE, AMPHETAMINE ASPARTATE MONOHYDRATE, DEXTROAMPHETAMINE SULFATE AND AMPHETAMINE SULFATE 5; 5; 5; 5 MG/1; MG/1; MG/1; MG/1
20 CAPSULE, EXTENDED RELEASE ORAL EVERY MORNING
Qty: 30 CAPSULE | Refills: 0 | Status: SHIPPED | OUTPATIENT
Start: 2024-07-17 | End: 2024-08-16

## 2024-07-17 RX ORDER — GLIPIZIDE 5 MG/1
5 TABLET ORAL DAILY
Qty: 90 TABLET | Refills: 3 | Status: SHIPPED | OUTPATIENT
Start: 2024-07-17 | End: 2025-07-17

## 2024-07-17 ASSESSMENT — ACTIVITIES OF DAILY LIVING (ADL)
TAKING_MEDICATION: INDEPENDENT
DRESSING: INDEPENDENT
GROCERY_SHOPPING: INDEPENDENT
DOING_HOUSEWORK: INDEPENDENT
MANAGING_FINANCES: INDEPENDENT
BATHING: INDEPENDENT

## 2024-07-17 ASSESSMENT — PATIENT HEALTH QUESTIONNAIRE - PHQ9
1. LITTLE INTEREST OR PLEASURE IN DOING THINGS: NOT AT ALL
SUM OF ALL RESPONSES TO PHQ9 QUESTIONS 1 AND 2: 0
2. FEELING DOWN, DEPRESSED OR HOPELESS: NOT AT ALL

## 2024-07-17 ASSESSMENT — ENCOUNTER SYMPTOMS
ABDOMINAL PAIN: 0
SHORTNESS OF BREATH: 0
CHEST TIGHTNESS: 0
PALPITATIONS: 0
ARTHRALGIAS: 0
CONFUSION: 0
FEVER: 0
CHILLS: 0

## 2024-07-17 NOTE — ASSESSMENT & PLAN NOTE
Recent labs reviewed with patient  He has gained about 13 pounds since previous visit we reviewed diet exercise and weight loss strategies    Pneumococcal vaccine series completed  RSV series completed  Colonoscopy up-to-date

## 2024-07-17 NOTE — ASSESSMENT & PLAN NOTE
We discussed now that he is retired and given his age to consider trying to wean off the Adderall which she has been on for many years.  He is in agreement will decrease Adderall XR to 20 mg daily and continue to monitor    I have personally reviewed the OARRS report.  I have considered the risks of abuse,dependence, addiction and diversion.  I believe it is clinically appropriate to prescribe this medication.

## 2024-07-17 NOTE — PROGRESS NOTES
"Subjective   Reason for Visit: Dale Schrader is an 74 y.o. male here for a Medicare Wellness visit.     Past Medical, Surgical, and Family History reviewed and updated in chart.    Reviewed all medications by prescribing practitioner or clinical pharmacist (such as prescriptions, OTCs, herbal therapies and supplements) and documented in the medical record.    HPI  Patient today for follow-up of ongoing healthcare issues and review of recent lab work.  He admits that he has not been following a diet as closely as he should and not exercising the way he used to.  His result is gained about 13 pounds since his previous visit a few months ago.  He is retired now says he seems to be adjusting to [employer] life.  Does not have nearly the demands that he used to with his job.  Patient Care Team:  Dyllan Hernandez MD as PCP - General  Dyllan Hernandez MD as PCP - [employer] PCP     Review of Systems   Constitutional:  Negative for chills and fever.   HENT:  Negative for congestion and ear pain.    Eyes:  Negative for visual disturbance.   Respiratory:  Negative for chest tightness and shortness of breath.    Cardiovascular:  Negative for chest pain and palpitations.   Gastrointestinal:  Negative for abdominal pain.   Musculoskeletal:  Negative for arthralgias.   Skin:  Negative for pallor.   Psychiatric/Behavioral:  Negative for confusion.        Objective   Vitals:  /79   Pulse 82   Temp 36.3 °C (97.4 °F)   Resp 14   Ht 1.956 m (6' 5\")   Wt 113 kg (250 lb)   SpO2 94%   BMI 29.65 kg/m²       Physical Exam  Vitals and nursing note reviewed.   Constitutional:       General: He is not in acute distress.     Appearance: Normal appearance. He is not ill-appearing.   HENT:      Head: Normocephalic and atraumatic.      Right Ear: Tympanic membrane, ear canal and external ear normal.      Left Ear: Tympanic membrane, ear canal and external ear normal.      Mouth/Throat:      Pharynx: Oropharynx " is clear.   Eyes:      Extraocular Movements: Extraocular movements intact.   Cardiovascular:      Rate and Rhythm: Normal rate and regular rhythm.      Pulses: Normal pulses.      Heart sounds: Normal heart sounds.   Pulmonary:      Effort: Pulmonary effort is normal.      Breath sounds: Normal breath sounds.   Abdominal:      General: Abdomen is flat. Bowel sounds are normal.      Palpations: Abdomen is soft.      Tenderness: There is no abdominal tenderness.   Musculoskeletal:         General: Normal range of motion.      Cervical back: Neck supple.   Skin:     General: Skin is warm.   Neurological:      Mental Status: He is alert and oriented to person, place, and time. Mental status is at baseline.   Psychiatric:         Mood and Affect: Mood normal.       Recent Results (from the past 1008 hour(s))   Comprehensive Metabolic Panel    Collection Time: 07/16/24 12:27 PM   Result Value Ref Range    Glucose 132 (H) 74 - 99 mg/dL    Sodium 136 136 - 145 mmol/L    Potassium 4.4 3.5 - 5.3 mmol/L    Chloride 103 98 - 107 mmol/L    Bicarbonate 23 21 - 32 mmol/L    Anion Gap 14 10 - 20 mmol/L    Urea Nitrogen 24 (H) 6 - 23 mg/dL    Creatinine 1.24 0.50 - 1.30 mg/dL    eGFR 62 >60 mL/min/1.73m*2    Calcium 9.1 8.6 - 10.3 mg/dL    Albumin 4.5 3.4 - 5.0 g/dL    Alkaline Phosphatase 56 33 - 136 U/L    Total Protein 7.3 6.4 - 8.2 g/dL    AST 30 9 - 39 U/L    Bilirubin, Total 0.7 0.0 - 1.2 mg/dL    ALT 35 10 - 52 U/L   Hemoglobin A1C    Collection Time: 07/16/24 12:27 PM   Result Value Ref Range    Hemoglobin A1C 8.0 (H) see below %    Estimated Average Glucose 183 Not Established mg/dL   Lipid Panel    Collection Time: 07/16/24 12:27 PM   Result Value Ref Range    Cholesterol 146 0 - 199 mg/dL    HDL-Cholesterol 41.7 mg/dL    Cholesterol/HDL Ratio 3.5     LDL Calculated 69 <=99 mg/dL    VLDL 36 0 - 40 mg/dL    Triglycerides 179 (H) 0 - 149 mg/dL    Non HDL Cholesterol 104 0 - 149 mg/dL     Recent labs reviewed with  patient    A1c is gone up most likely secondary to lifestyle issues and weight gain.  We discussed diet exercise and weight loss strategies and we will also add Jardiance 10 mg daily to his regimen of diabetic medication.    Yearly diabetic eye exam    We discussed now that he is retired looking to try to wean off the Adderall he is in agreement we will decrease Adderall XR to 20 mg daily and continue to monitor the situation    Return to office 4 months with repeat fasting labs    Assessment/Plan   Problem List Items Addressed This Visit       ADHD    Current Assessment & Plan     We discussed now that he is retired and given his age to consider trying to wean off the Adderall which she has been on for many years.  He is in agreement will decrease Adderall XR to 20 mg daily and continue to monitor    I have personally reviewed the OARRS report.  I have considered the risks of abuse,dependence, addiction and diversion.  I believe it is clinically appropriate to prescribe this medication.           Relevant Medications    amphetamine-dextroamphetamine XR (Adderall XR) 20 mg 24 hr capsule    Other Relevant Orders    Follow Up In Primary Care - Established    Benign essential hypertension    Current Assessment & Plan     Stable continue current treatment         Relevant Medications    chlorthalidone (Hygroton) 25 mg tablet    fosinopril (Monopril) 20 mg tablet    Other Relevant Orders    Follow Up In Primary Care - Established    CBC    Comprehensive Metabolic Panel    Hyperlipidemia    Current Assessment & Plan     Stable continue simvastatin 10 mg daily         Relevant Orders    Follow Up In Primary Care - Established    Comprehensive Metabolic Panel    Lipid Panel    Insomnia    Current Assessment & Plan     Stable patient continues with zolpidem 10 mg nightly as needed         Vitamin D deficiency    Current Assessment & Plan     Continue to monitor and supplement         Relevant Orders    Vitamin D  25-Hydroxy,Total (for eval of Vitamin D levels)    Type 2 diabetes mellitus with hyperglycemia, with long-term current use of insulin (Multi)    Current Assessment & Plan     A1c is gone up to 8.0%.  In addition to lifestyle modification and weight loss we will add Jardiance 10 mg daily continue the glipizide and the metformin for now make further adjustments pending test results.  We discussed ultimately looking toward getting off the glipizide.         Relevant Medications    glipiZIDE (Glucotrol) 5 mg tablet    empagliflozin (Jardiance) 10 mg    Other Relevant Orders    Follow Up In Primary Care - Established    CBC    Comprehensive Metabolic Panel    Hemoglobin A1C    Albumin , Urine Random    Screening for prostate cancer    Current Assessment & Plan     Screening PSA         Relevant Orders    Prostate Specific Antigen, Screen    Thyroid disorder screen    Current Assessment & Plan     TSH with reflex         Relevant Orders    TSH with reflex to Free T4 if abnormal    Medicare annual wellness visit, initial - Primary    Current Assessment & Plan     Recent labs reviewed    Immunizations up-to-date  Colon cancer screening up-to-date         Annual physical exam    Current Assessment & Plan     Recent labs reviewed with patient  He has gained about 13 pounds since previous visit we reviewed diet exercise and weight loss strategies    Pneumococcal vaccine series completed  RSV series completed  Colonoscopy up-to-date

## 2024-07-17 NOTE — ASSESSMENT & PLAN NOTE
A1c is gone up to 8.0%.  In addition to lifestyle modification and weight loss we will add Jardiance 10 mg daily continue the glipizide and the metformin for now make further adjustments pending test results.  We discussed ultimately looking toward getting off the glipizide.

## 2024-07-25 PROCEDURE — RXMED WILLOW AMBULATORY MEDICATION CHARGE

## 2024-07-29 ENCOUNTER — PHARMACY VISIT (OUTPATIENT)
Dept: PHARMACY | Facility: CLINIC | Age: 74
End: 2024-07-29
Payer: MEDICARE

## 2024-08-06 PROCEDURE — RXMED WILLOW AMBULATORY MEDICATION CHARGE

## 2024-08-14 ENCOUNTER — TELEPHONE (OUTPATIENT)
Dept: PRIMARY CARE | Facility: CLINIC | Age: 74
End: 2024-08-14
Payer: MEDICARE

## 2024-08-14 DIAGNOSIS — G47.00 INSOMNIA, UNSPECIFIED TYPE: ICD-10-CM

## 2024-08-14 RX ORDER — ZOLPIDEM TARTRATE 10 MG/1
10 TABLET ORAL NIGHTLY PRN
Qty: 30 TABLET | Refills: 2 | Status: SHIPPED | OUTPATIENT
Start: 2024-08-14 | End: 2025-02-14

## 2024-08-14 NOTE — TELEPHONE ENCOUNTER
Rx Refill Request Telephone Encounter    Name:  Dale Schrader  :  [id number]  Medication Name:      zolpidem (Ambien) 10 mg tablet   Route: TAKE 1 TABLET BY MOUTH AT BEDTIME AS NEEDED FOR SLEEP    Specific Pharmacy location:  [facility]   Date of last appointment:  24  Date of next appointment:  24  Best number to reach patient: [phone]

## 2024-08-15 ENCOUNTER — PHARMACY VISIT (OUTPATIENT)
Dept: PHARMACY | Facility: CLINIC | Age: 74
End: 2024-08-15
Payer: MEDICARE

## 2024-08-15 PROCEDURE — RXMED WILLOW AMBULATORY MEDICATION CHARGE

## 2024-08-27 ENCOUNTER — TELEPHONE (OUTPATIENT)
Dept: PRIMARY CARE | Facility: CLINIC | Age: 74
End: 2024-08-27
Payer: MEDICARE

## 2024-08-27 DIAGNOSIS — F90.9 ATTENTION DEFICIT HYPERACTIVITY DISORDER (ADHD), UNSPECIFIED ADHD TYPE: ICD-10-CM

## 2024-08-27 PROCEDURE — RXMED WILLOW AMBULATORY MEDICATION CHARGE

## 2024-08-27 RX ORDER — DEXTROAMPHETAMINE SACCHARATE, AMPHETAMINE ASPARTATE MONOHYDRATE, DEXTROAMPHETAMINE SULFATE AND AMPHETAMINE SULFATE 5; 5; 5; 5 MG/1; MG/1; MG/1; MG/1
20 CAPSULE, EXTENDED RELEASE ORAL EVERY MORNING
Qty: 30 CAPSULE | Refills: 0 | Status: SHIPPED | OUTPATIENT
Start: 2024-08-27 | End: 2024-09-27

## 2024-08-27 NOTE — TELEPHONE ENCOUNTER
Rx Refill Request Telephone Encounter    Name:  Dale Schrader  :  [id number]  Medication Name:      amphetamine-dextroamphetamine XR (Adderall XR) 20 mg 24 hr capsule [[id number]]  1 cap taken in the morning                Specific Pharmacy location:  [facility]  Date of last appointment:  2024  Date of next appointment:  2024  Best number to reach patient:  [phone]

## 2024-08-28 ENCOUNTER — PHARMACY VISIT (OUTPATIENT)
Dept: PHARMACY | Facility: CLINIC | Age: 74
End: 2024-08-28
Payer: MEDICARE

## 2024-09-18 PROCEDURE — RXMED WILLOW AMBULATORY MEDICATION CHARGE

## 2024-09-19 ENCOUNTER — PHARMACY VISIT (OUTPATIENT)
Dept: PHARMACY | Facility: CLINIC | Age: 74
End: 2024-09-19
Payer: MEDICARE

## 2024-09-19 PROCEDURE — RXMED WILLOW AMBULATORY MEDICATION CHARGE

## 2024-09-20 ENCOUNTER — PHARMACY VISIT (OUTPATIENT)
Dept: PHARMACY | Facility: CLINIC | Age: 74
End: 2024-09-20
Payer: MEDICARE

## 2024-09-25 ENCOUNTER — TELEPHONE (OUTPATIENT)
Dept: PRIMARY CARE | Facility: CLINIC | Age: 74
End: 2024-09-25
Payer: MEDICARE

## 2024-09-25 DIAGNOSIS — F90.9 ATTENTION DEFICIT HYPERACTIVITY DISORDER (ADHD), UNSPECIFIED ADHD TYPE: ICD-10-CM

## 2024-09-25 RX ORDER — DEXTROAMPHETAMINE SACCHARATE, AMPHETAMINE ASPARTATE MONOHYDRATE, DEXTROAMPHETAMINE SULFATE AND AMPHETAMINE SULFATE 5; 5; 5; 5 MG/1; MG/1; MG/1; MG/1
20 CAPSULE, EXTENDED RELEASE ORAL EVERY MORNING
Qty: 30 CAPSULE | Refills: 0 | Status: SHIPPED | OUTPATIENT
Start: 2024-09-25 | End: 2024-10-29

## 2024-09-25 NOTE — TELEPHONE ENCOUNTER
Patient called in about the  amphetamine-dextroamphetamine XR 20 MG.      The patient was told that the 20 MG is on backorder and that the pharmacy does have the 25 MG and 30 MG in stock. The patient is asking can he be prescribed the 25 MG or 30 MG just for this month?

## 2024-09-25 NOTE — TELEPHONE ENCOUNTER
Rx Refill Request Telephone Encounter    Name:  Dale Schrader  :  [id number]  Medication Name:      amphetamine-dextroamphetamine XR (Adderall XR) 20 mg 24 hr capsule  take 1 tab by mouth daily in the am. Do not crush or chew                    Specific Pharmacy location:  [facility]  Date of last appointment:  2024  Date of next appointment:  2024  Best number to reach patient:  [phone]

## 2024-09-26 PROCEDURE — RXMED WILLOW AMBULATORY MEDICATION CHARGE

## 2024-09-26 NOTE — TELEPHONE ENCOUNTER
Spoke to pharmacy. They anticipate getting the 20mg tomorrow. Patients with prescriptions on hold/ will be first served. They have 10mg available also. Ok given for 10mg  two po daily #60 substitute if the 20mg does not come in.

## 2024-09-29 ENCOUNTER — PHARMACY VISIT (OUTPATIENT)
Dept: PHARMACY | Facility: CLINIC | Age: 74
End: 2024-09-29
Payer: MEDICARE

## 2024-10-22 ENCOUNTER — PHARMACY VISIT (OUTPATIENT)
Dept: PHARMACY | Facility: CLINIC | Age: 74
End: 2024-10-22
Payer: MEDICARE

## 2024-10-22 PROCEDURE — RXMED WILLOW AMBULATORY MEDICATION CHARGE

## 2024-10-23 ENCOUNTER — TELEPHONE (OUTPATIENT)
Dept: PRIMARY CARE | Facility: CLINIC | Age: 74
End: 2024-10-23
Payer: MEDICARE

## 2024-10-23 DIAGNOSIS — F90.9 ATTENTION DEFICIT HYPERACTIVITY DISORDER (ADHD), UNSPECIFIED ADHD TYPE: ICD-10-CM

## 2024-10-23 PROCEDURE — RXMED WILLOW AMBULATORY MEDICATION CHARGE

## 2024-10-23 RX ORDER — DEXTROAMPHETAMINE SACCHARATE, AMPHETAMINE ASPARTATE MONOHYDRATE, DEXTROAMPHETAMINE SULFATE AND AMPHETAMINE SULFATE 5; 5; 5; 5 MG/1; MG/1; MG/1; MG/1
20 CAPSULE, EXTENDED RELEASE ORAL EVERY MORNING
Qty: 30 CAPSULE | Refills: 0 | Status: SHIPPED | OUTPATIENT
Start: 2024-10-23 | End: 2024-11-26

## 2024-10-23 NOTE — TELEPHONE ENCOUNTER
Rx Refill Request Telephone Encounter    Name:  Dale Schrader  :  [id number]  Medication Name:      Adderall XR 20mf 24hr capsule take 1 cap by mouth once daily in the morning.                       Specific Pharmacy location:  [facility]  Date of last appointment:  2024  Date of next appointment:  2024  Best number to reach patient:  [phone]

## 2024-10-27 ENCOUNTER — PHARMACY VISIT (OUTPATIENT)
Dept: PHARMACY | Facility: CLINIC | Age: 74
End: 2024-10-27
Payer: MEDICARE

## 2024-11-06 PROCEDURE — RXMED WILLOW AMBULATORY MEDICATION CHARGE

## 2024-11-18 ENCOUNTER — TELEPHONE (OUTPATIENT)
Dept: PRIMARY CARE | Facility: CLINIC | Age: 74
End: 2024-11-18
Payer: MEDICARE

## 2024-11-18 DIAGNOSIS — G47.00 INSOMNIA, UNSPECIFIED TYPE: ICD-10-CM

## 2024-11-18 RX ORDER — ZOLPIDEM TARTRATE 10 MG/1
10 TABLET ORAL NIGHTLY PRN
Qty: 30 TABLET | Refills: 2 | Status: SHIPPED | OUTPATIENT
Start: 2024-11-18 | End: 2025-05-21

## 2024-11-18 NOTE — TELEPHONE ENCOUNTER
Rx Refill Request Telephone Encounter    Name:  aDle Schrader  :  [id number]  Medication Name:      zolpidem (Ambien) 10 mg tablet   Route: TAKE 1 TABLET BY MOUTH AT BEDTIME AS NEEDED FOR SLEEP      Specific Pharmacy location:  [facility]   Date of last appointment:  24  Date of next appointment:  24  Best number to reach patient: [phone]

## 2024-11-20 ENCOUNTER — PHARMACY VISIT (OUTPATIENT)
Dept: PHARMACY | Facility: CLINIC | Age: 74
End: 2024-11-20
Payer: MEDICARE

## 2024-11-20 PROCEDURE — RXMED WILLOW AMBULATORY MEDICATION CHARGE

## 2024-11-25 ENCOUNTER — LAB (OUTPATIENT)
Dept: LAB | Facility: LAB | Age: 74
End: 2024-11-25
Payer: MEDICARE

## 2024-11-25 DIAGNOSIS — E11.65 TYPE 2 DIABETES MELLITUS WITH HYPERGLYCEMIA, WITH LONG-TERM CURRENT USE OF INSULIN: ICD-10-CM

## 2024-11-25 DIAGNOSIS — E55.9 VITAMIN D DEFICIENCY: ICD-10-CM

## 2024-11-25 DIAGNOSIS — Z13.29 THYROID DISORDER SCREEN: ICD-10-CM

## 2024-11-25 DIAGNOSIS — Z12.5 SCREENING FOR PROSTATE CANCER: ICD-10-CM

## 2024-11-25 DIAGNOSIS — E78.2 MIXED HYPERLIPIDEMIA: ICD-10-CM

## 2024-11-25 DIAGNOSIS — I10 BENIGN ESSENTIAL HYPERTENSION: ICD-10-CM

## 2024-11-25 DIAGNOSIS — Z79.4 TYPE 2 DIABETES MELLITUS WITH HYPERGLYCEMIA, WITH LONG-TERM CURRENT USE OF INSULIN: ICD-10-CM

## 2024-11-25 LAB
25(OH)D3 SERPL-MCNC: 68 NG/ML (ref 30–100)
ALBUMIN SERPL BCP-MCNC: 4.5 G/DL (ref 3.4–5)
ALP SERPL-CCNC: 53 U/L (ref 33–136)
ALT SERPL W P-5'-P-CCNC: 28 U/L (ref 10–52)
ANION GAP SERPL CALC-SCNC: 15 MMOL/L (ref 10–20)
AST SERPL W P-5'-P-CCNC: 26 U/L (ref 9–39)
BILIRUB SERPL-MCNC: 0.9 MG/DL (ref 0–1.2)
BUN SERPL-MCNC: 14 MG/DL (ref 6–23)
CALCIUM SERPL-MCNC: 9.4 MG/DL (ref 8.6–10.3)
CHLORIDE SERPL-SCNC: 102 MMOL/L (ref 98–107)
CHOLEST SERPL-MCNC: 167 MG/DL (ref 0–199)
CHOLESTEROL/HDL RATIO: 4
CO2 SERPL-SCNC: 24 MMOL/L (ref 21–32)
CREAT SERPL-MCNC: 1.13 MG/DL (ref 0.5–1.3)
CREAT UR-MCNC: 125.3 MG/DL (ref 20–370)
EGFRCR SERPLBLD CKD-EPI 2021: 69 ML/MIN/1.73M*2
ERYTHROCYTE [DISTWIDTH] IN BLOOD BY AUTOMATED COUNT: 12.3 % (ref 11.5–14.5)
GLUCOSE SERPL-MCNC: 119 MG/DL (ref 74–99)
HCT VFR BLD AUTO: 47.2 % (ref 41–52)
HDLC SERPL-MCNC: 41.3 MG/DL
HGB BLD-MCNC: 16 G/DL (ref 13.5–17.5)
LDLC SERPL CALC-MCNC: 90 MG/DL
MCH RBC QN AUTO: 32.1 PG (ref 26–34)
MCHC RBC AUTO-ENTMCNC: 33.9 G/DL (ref 32–36)
MCV RBC AUTO: 95 FL (ref 80–100)
MICROALBUMIN UR-MCNC: 8.5 MG/L
MICROALBUMIN/CREAT UR: 6.8 UG/MG CREAT
NON HDL CHOLESTEROL: 126 MG/DL (ref 0–149)
NRBC BLD-RTO: 0 /100 WBCS (ref 0–0)
PLATELET # BLD AUTO: 254 X10*3/UL (ref 150–450)
POTASSIUM SERPL-SCNC: 4.4 MMOL/L (ref 3.5–5.3)
PROT SERPL-MCNC: 7.2 G/DL (ref 6.4–8.2)
PSA SERPL-MCNC: 0.91 NG/ML
RBC # BLD AUTO: 4.99 X10*6/UL (ref 4.5–5.9)
SODIUM SERPL-SCNC: 137 MMOL/L (ref 136–145)
TRIGL SERPL-MCNC: 179 MG/DL (ref 0–149)
TSH SERPL-ACNC: 2.47 MIU/L (ref 0.44–3.98)
VLDL: 36 MG/DL (ref 0–40)
WBC # BLD AUTO: 8.4 X10*3/UL (ref 4.4–11.3)

## 2024-11-25 PROCEDURE — 80053 COMPREHEN METABOLIC PANEL: CPT

## 2024-11-25 PROCEDURE — 36415 COLL VENOUS BLD VENIPUNCTURE: CPT

## 2024-11-25 PROCEDURE — G0103 PSA SCREENING: HCPCS

## 2024-11-25 PROCEDURE — 85027 COMPLETE CBC AUTOMATED: CPT

## 2024-11-25 PROCEDURE — 82043 UR ALBUMIN QUANTITATIVE: CPT

## 2024-11-25 PROCEDURE — 84443 ASSAY THYROID STIM HORMONE: CPT

## 2024-11-25 PROCEDURE — 80061 LIPID PANEL: CPT

## 2024-11-25 PROCEDURE — 83036 HEMOGLOBIN GLYCOSYLATED A1C: CPT

## 2024-11-25 PROCEDURE — 82306 VITAMIN D 25 HYDROXY: CPT

## 2024-11-25 PROCEDURE — 82570 ASSAY OF URINE CREATININE: CPT

## 2024-11-26 LAB
EST. AVERAGE GLUCOSE BLD GHB EST-MCNC: 126 MG/DL
HBA1C MFR BLD: 6 %

## 2024-11-27 ENCOUNTER — APPOINTMENT (OUTPATIENT)
Dept: PRIMARY CARE | Facility: CLINIC | Age: 74
End: 2024-11-27
Payer: MEDICARE

## 2024-11-27 ENCOUNTER — PHARMACY VISIT (OUTPATIENT)
Dept: PHARMACY | Facility: CLINIC | Age: 74
End: 2024-11-27
Payer: MEDICARE

## 2024-11-27 VITALS
RESPIRATION RATE: 14 BRPM | BODY MASS INDEX: 27.16 KG/M2 | SYSTOLIC BLOOD PRESSURE: 139 MMHG | OXYGEN SATURATION: 95 % | TEMPERATURE: 97 F | DIASTOLIC BLOOD PRESSURE: 80 MMHG | WEIGHT: 229 LBS | HEART RATE: 87 BPM

## 2024-11-27 DIAGNOSIS — Z79.4 TYPE 2 DIABETES MELLITUS WITH HYPERGLYCEMIA, WITH LONG-TERM CURRENT USE OF INSULIN: Primary | ICD-10-CM

## 2024-11-27 DIAGNOSIS — I10 BENIGN ESSENTIAL HYPERTENSION: ICD-10-CM

## 2024-11-27 DIAGNOSIS — Z23 ENCOUNTER FOR IMMUNIZATION: ICD-10-CM

## 2024-11-27 DIAGNOSIS — F90.9 ATTENTION DEFICIT HYPERACTIVITY DISORDER (ADHD), UNSPECIFIED ADHD TYPE: ICD-10-CM

## 2024-11-27 DIAGNOSIS — E11.65 TYPE 2 DIABETES MELLITUS WITH HYPERGLYCEMIA, WITH LONG-TERM CURRENT USE OF INSULIN: Primary | ICD-10-CM

## 2024-11-27 DIAGNOSIS — E78.2 MIXED HYPERLIPIDEMIA: ICD-10-CM

## 2024-11-27 PROCEDURE — 1159F MED LIST DOCD IN RCRD: CPT | Performed by: FAMILY MEDICINE

## 2024-11-27 PROCEDURE — G2211 COMPLEX E/M VISIT ADD ON: HCPCS | Performed by: FAMILY MEDICINE

## 2024-11-27 PROCEDURE — 3079F DIAST BP 80-89 MM HG: CPT | Performed by: FAMILY MEDICINE

## 2024-11-27 PROCEDURE — G0008 ADMIN INFLUENZA VIRUS VAC: HCPCS | Performed by: FAMILY MEDICINE

## 2024-11-27 PROCEDURE — 99214 OFFICE O/P EST MOD 30 MIN: CPT | Performed by: FAMILY MEDICINE

## 2024-11-27 PROCEDURE — 3044F HG A1C LEVEL LT 7.0%: CPT | Performed by: FAMILY MEDICINE

## 2024-11-27 PROCEDURE — 1160F RVW MEDS BY RX/DR IN RCRD: CPT | Performed by: FAMILY MEDICINE

## 2024-11-27 PROCEDURE — 3061F NEG MICROALBUMINURIA REV: CPT | Performed by: FAMILY MEDICINE

## 2024-11-27 PROCEDURE — 3075F SYST BP GE 130 - 139MM HG: CPT | Performed by: FAMILY MEDICINE

## 2024-11-27 PROCEDURE — 4010F ACE/ARB THERAPY RXD/TAKEN: CPT | Performed by: FAMILY MEDICINE

## 2024-11-27 PROCEDURE — 3048F LDL-C <100 MG/DL: CPT | Performed by: FAMILY MEDICINE

## 2024-11-27 PROCEDURE — 1123F ACP DISCUSS/DSCN MKR DOCD: CPT | Performed by: FAMILY MEDICINE

## 2024-11-27 PROCEDURE — RXMED WILLOW AMBULATORY MEDICATION CHARGE

## 2024-11-27 PROCEDURE — 90662 IIV NO PRSV INCREASED AG IM: CPT | Performed by: FAMILY MEDICINE

## 2024-11-27 PROCEDURE — 1036F TOBACCO NON-USER: CPT | Performed by: FAMILY MEDICINE

## 2024-11-27 RX ORDER — KETOCONAZOLE 20 MG/G
CREAM TOPICAL
COMMUNITY
Start: 2023-03-22

## 2024-11-27 RX ORDER — DEXTROAMPHETAMINE SACCHARATE, AMPHETAMINE ASPARTATE MONOHYDRATE, DEXTROAMPHETAMINE SULFATE AND AMPHETAMINE SULFATE 5; 5; 5; 5 MG/1; MG/1; MG/1; MG/1
20 CAPSULE, EXTENDED RELEASE ORAL EVERY MORNING
Qty: 30 CAPSULE | Refills: 0 | Status: SHIPPED | OUTPATIENT
Start: 2024-11-27 | End: 2024-12-27

## 2024-11-27 RX ORDER — METOPROLOL SUCCINATE 25 MG/1
TABLET, EXTENDED RELEASE ORAL
Qty: 90 TABLET | Refills: 1 | Status: SHIPPED | OUTPATIENT
Start: 2024-11-27 | End: 2025-11-27

## 2024-11-27 RX ORDER — CHLORTHALIDONE 25 MG/1
25 TABLET ORAL DAILY
Qty: 90 TABLET | Refills: 1 | Status: SHIPPED | OUTPATIENT
Start: 2024-11-27 | End: 2025-11-27

## 2024-11-27 RX ORDER — SIMVASTATIN 10 MG/1
10 TABLET, FILM COATED ORAL EVERY EVENING
Qty: 90 TABLET | Refills: 1 | Status: SHIPPED | OUTPATIENT
Start: 2024-11-27

## 2024-11-27 RX ORDER — GLIPIZIDE 5 MG/1
5 TABLET ORAL DAILY
Qty: 90 TABLET | Refills: 1 | Status: SHIPPED | OUTPATIENT
Start: 2024-11-27 | End: 2025-11-27

## 2024-11-27 RX ORDER — FOSINOPRIL SODIUM 20 MG/1
20 TABLET ORAL DAILY
Qty: 90 TABLET | Refills: 1 | Status: SHIPPED | OUTPATIENT
Start: 2024-11-27

## 2024-11-27 RX ORDER — METFORMIN HYDROCHLORIDE 1000 MG/1
1000 TABLET ORAL 2 TIMES DAILY
Qty: 180 TABLET | Refills: 1 | Status: SHIPPED | OUTPATIENT
Start: 2024-11-27 | End: 2025-11-27

## 2024-11-27 ASSESSMENT — ENCOUNTER SYMPTOMS
ARTHRALGIAS: 0
CHILLS: 0
SHORTNESS OF BREATH: 0
CONFUSION: 0
CHEST TIGHTNESS: 0
ABDOMINAL PAIN: 0
PALPITATIONS: 0
FEVER: 0

## 2024-11-27 NOTE — PROGRESS NOTES
Subjective   Patient ID: Dale Schrader is a 74 y.o. male who presents for Follow-up (4 month).    HPI patient today for follow-up of ongoing healthcare issues and review of lab work also needs several of his medications refilled and he would like to get a flu shot.  For the most part says he is doing well.  Says he is tolerating the Adderall XR 20 mg has not noticed a big drop-off with regards to focus and task completing.    Review of Systems   Constitutional:  Negative for chills and fever.   HENT:  Negative for congestion and ear pain.    Eyes:  Negative for visual disturbance.   Respiratory:  Negative for chest tightness and shortness of breath.    Cardiovascular:  Negative for chest pain and palpitations.   Gastrointestinal:  Negative for abdominal pain.   Musculoskeletal:  Negative for arthralgias.   Skin:  Negative for pallor.   Psychiatric/Behavioral:  Negative for confusion.        Objective   /80   Pulse 87   Temp 36.1 °C (97 °F)   Resp 14   Wt 104 kg (229 lb)   SpO2 95%   BMI 27.16 kg/m²     Physical Exam  Vitals and nursing note reviewed.   Constitutional:       General: He is not in acute distress.     Appearance: Normal appearance. He is not ill-appearing.   HENT:      Head: Normocephalic and atraumatic.      Right Ear: Tympanic membrane, ear canal and external ear normal.      Left Ear: Tympanic membrane, ear canal and external ear normal.      Mouth/Throat:      Pharynx: Oropharynx is clear.   Eyes:      Extraocular Movements: Extraocular movements intact.   Cardiovascular:      Rate and Rhythm: Normal rate and regular rhythm.      Pulses: Normal pulses.      Heart sounds: Normal heart sounds.   Pulmonary:      Effort: Pulmonary effort is normal.      Breath sounds: Normal breath sounds.   Abdominal:      General: Abdomen is flat. Bowel sounds are normal.      Palpations: Abdomen is soft.      Tenderness: There is no abdominal tenderness.   Musculoskeletal:         General: Normal  range of motion.      Cervical back: Neck supple.   Skin:     General: Skin is warm.   Neurological:      Mental Status: He is alert and oriented to person, place, and time. Mental status is at baseline.   Psychiatric:         Mood and Affect: Mood normal.       Recent Results (from the past 6 weeks)   CBC    Collection Time: 11/25/24  8:43 AM   Result Value Ref Range    WBC 8.4 4.4 - 11.3 x10*3/uL    nRBC 0.0 0.0 - 0.0 /100 WBCs    RBC 4.99 4.50 - 5.90 x10*6/uL    Hemoglobin 16.0 13.5 - 17.5 g/dL    Hematocrit 47.2 41.0 - 52.0 %    MCV 95 80 - 100 fL    MCH 32.1 26.0 - 34.0 pg    MCHC 33.9 32.0 - 36.0 g/dL    RDW 12.3 11.5 - 14.5 %    Platelets 254 150 - 450 x10*3/uL   Comprehensive Metabolic Panel    Collection Time: 11/25/24  8:43 AM   Result Value Ref Range    Glucose 119 (H) 74 - 99 mg/dL    Sodium 137 136 - 145 mmol/L    Potassium 4.4 3.5 - 5.3 mmol/L    Chloride 102 98 - 107 mmol/L    Bicarbonate 24 21 - 32 mmol/L    Anion Gap 15 10 - 20 mmol/L    Urea Nitrogen 14 6 - 23 mg/dL    Creatinine 1.13 0.50 - 1.30 mg/dL    eGFR 69 >60 mL/min/1.73m*2    Calcium 9.4 8.6 - 10.3 mg/dL    Albumin 4.5 3.4 - 5.0 g/dL    Alkaline Phosphatase 53 33 - 136 U/L    Total Protein 7.2 6.4 - 8.2 g/dL    AST 26 9 - 39 U/L    Bilirubin, Total 0.9 0.0 - 1.2 mg/dL    ALT 28 10 - 52 U/L   Hemoglobin A1C    Collection Time: 11/25/24  8:43 AM   Result Value Ref Range    Hemoglobin A1C 6.0 (H) See comment %    Estimated Average Glucose 126 Not Established mg/dL   Lipid Panel    Collection Time: 11/25/24  8:43 AM   Result Value Ref Range    Cholesterol 167 0 - 199 mg/dL    HDL-Cholesterol 41.3 mg/dL    Cholesterol/HDL Ratio 4.0     LDL Calculated 90 <=99 mg/dL    VLDL 36 0 - 40 mg/dL    Triglycerides 179 (H) 0 - 149 mg/dL    Non HDL Cholesterol 126 0 - 149 mg/dL   Prostate Specific Antigen, Screen    Collection Time: 11/25/24  8:43 AM   Result Value Ref Range    Prostate Specific Antigen,Screen 0.91 <=4.00 ng/mL   TSH with reflex to Free T4  if abnormal    Collection Time: 11/25/24  8:43 AM   Result Value Ref Range    Thyroid Stimulating Hormone 2.47 0.44 - 3.98 mIU/L   Vitamin D 25-Hydroxy,Total (for eval of Vitamin D levels)    Collection Time: 11/25/24  8:43 AM   Result Value Ref Range    Vitamin D, 25-Hydroxy, Total 68 30 - 100 ng/mL   Albumin , Urine Random    Collection Time: 11/25/24  8:43 AM   Result Value Ref Range    Albumin, Urine Random 8.5 Not established mg/L    Creatinine, Urine Random 125.3 20.0 - 370.0 mg/dL    Albumin/Creatinine Ratio 6.8 <30.0 ug/mg Creat     Recent labs reviewed nice improvement in A1c other numbers are stable for patient he will continue current medications necessary refills provided    High Dose flu shot x 1    Continue to work on lifestyle modifications when it comes to diet and exercise    return to our office 4 months with repeat fasting labs    Assessment/Plan   Problem List Items Addressed This Visit             ICD-10-CM    ADHD F90.9     Stable on Adderall XR 20 mg.  Patient does not feel he is ready to decrease it any further at this time.  Refill provided    I have personally reviewed the OARRS report.  I have considered the risks of abuse,dependence, addiction and diversion.  I believe it is clinically appropriate to prescribe this medication.            Relevant Medications    amphetamine-dextroamphetamine XR (Adderall XR) 20 mg 24 hr capsule    Other Relevant Orders    Follow Up In Primary Care - Established    Benign essential hypertension I10     Stable continue current medication         Relevant Medications    chlorthalidone (Hygroton) 25 mg tablet    fosinopril (Monopril) 20 mg tablet    metoprolol succinate XL (Toprol-XL) 25 mg 24 hr tablet    Other Relevant Orders    Comprehensive Metabolic Panel    Follow Up In Primary Care - Established    Hyperlipidemia E78.5     Stable continue simvastatin 10 mg daily along with dietary modification         Relevant Medications    simvastatin (Zocor) 10 mg  tablet    Other Relevant Orders    Comprehensive Metabolic Panel    Lipid Panel    Follow Up In Primary Care - Established    Type 2 diabetes mellitus with hyperglycemia, with long-term current use of insulin - Primary E11.65, Z79.4     A1c down to 6.0% marked improvement compared to a few months ago continue current diabetic medications along with lifestyle modifications         Relevant Medications    empagliflozin (Jardiance) 10 mg    glipiZIDE (Glucotrol) 5 mg tablet    metFORMIN (Glucophage) 1,000 mg tablet    Other Relevant Orders    Comprehensive Metabolic Panel    Hemoglobin A1C    Follow Up In Primary Care - Established    Encounter for immunization Z23     High-dose flu shot x 1         Relevant Orders    Flu vaccine, trivalent, preservative free, HIGH-DOSE, age 65y+ (Fluzone) (Completed)

## 2024-11-27 NOTE — ASSESSMENT & PLAN NOTE
Stable on Adderall XR 20 mg.  Patient does not feel he is ready to decrease it any further at this time.  Refill provided    I have personally reviewed the OARRS report.  I have considered the risks of abuse,dependence, addiction and diversion.  I believe it is clinically appropriate to prescribe this medication.

## 2024-11-27 NOTE — ASSESSMENT & PLAN NOTE
A1c down to 6.0% marked improvement compared to a few months ago continue current diabetic medications along with lifestyle modifications

## 2024-12-18 ENCOUNTER — PHARMACY VISIT (OUTPATIENT)
Dept: PHARMACY | Facility: CLINIC | Age: 74
End: 2024-12-18
Payer: MEDICARE

## 2024-12-18 PROCEDURE — RXMED WILLOW AMBULATORY MEDICATION CHARGE

## 2025-01-08 ENCOUNTER — TELEPHONE (OUTPATIENT)
Dept: PRIMARY CARE | Facility: CLINIC | Age: 75
End: 2025-01-08
Payer: MEDICARE

## 2025-01-08 DIAGNOSIS — F90.9 ATTENTION DEFICIT HYPERACTIVITY DISORDER (ADHD), UNSPECIFIED ADHD TYPE: ICD-10-CM

## 2025-01-08 PROCEDURE — RXMED WILLOW AMBULATORY MEDICATION CHARGE

## 2025-01-08 RX ORDER — DEXTROAMPHETAMINE SACCHARATE, AMPHETAMINE ASPARTATE MONOHYDRATE, DEXTROAMPHETAMINE SULFATE AND AMPHETAMINE SULFATE 5; 5; 5; 5 MG/1; MG/1; MG/1; MG/1
20 CAPSULE, EXTENDED RELEASE ORAL EVERY MORNING
Qty: 30 CAPSULE | Refills: 0 | Status: SHIPPED | OUTPATIENT
Start: 2025-01-08 | End: 2025-02-08

## 2025-01-08 NOTE — TELEPHONE ENCOUNTER
Rx Refill Request Telephone Encounter    Name:  Dale Schrader  :  [id number]  Medication Name:      amphetamine-dextroamphetamine XR (Adderall XR) 20 mg 24 hr capsule    Route: Take 1 capsule (20 mg) by mouth once daily in the morning. Do not crush or chew      Specific Pharmacy location:  [facility]   Date of last appointment:  24  Date of next appointment: 3/25/25   Best number to reach patient: [phone]

## 2025-01-09 ENCOUNTER — PHARMACY VISIT (OUTPATIENT)
Dept: PHARMACY | Facility: CLINIC | Age: 75
End: 2025-01-09
Payer: MEDICARE

## 2025-01-17 PROCEDURE — RXMED WILLOW AMBULATORY MEDICATION CHARGE

## 2025-01-19 ENCOUNTER — PHARMACY VISIT (OUTPATIENT)
Dept: PHARMACY | Facility: CLINIC | Age: 75
End: 2025-01-19
Payer: MEDICARE

## 2025-02-05 ENCOUNTER — TELEPHONE (OUTPATIENT)
Dept: PRIMARY CARE | Facility: CLINIC | Age: 75
End: 2025-02-05
Payer: MEDICARE

## 2025-02-05 DIAGNOSIS — F90.9 ATTENTION DEFICIT HYPERACTIVITY DISORDER (ADHD), UNSPECIFIED ADHD TYPE: ICD-10-CM

## 2025-02-05 PROCEDURE — RXMED WILLOW AMBULATORY MEDICATION CHARGE

## 2025-02-05 RX ORDER — DEXTROAMPHETAMINE SACCHARATE, AMPHETAMINE ASPARTATE MONOHYDRATE, DEXTROAMPHETAMINE SULFATE AND AMPHETAMINE SULFATE 5; 5; 5; 5 MG/1; MG/1; MG/1; MG/1
20 CAPSULE, EXTENDED RELEASE ORAL EVERY MORNING
Qty: 30 CAPSULE | Refills: 0 | Status: SHIPPED | OUTPATIENT
Start: 2025-02-05 | End: 2025-03-10

## 2025-02-05 NOTE — TELEPHONE ENCOUNTER
Rx Refill Request Telephone Encounter     Name:  Dale Schrader  :  [id number]  Medication Name:       amphetamine-dextroamphetamine XR (Adderall XR) 20 mg 24 hr capsule      Route: Take 1 capsule (20 mg) by mouth once daily in the morning. Do not crush or chew        Specific Pharmacy location:  [facility]    Date of last appointment:  24  Date of next appointment: 3/25/25   Best number to reach patient: [phone]

## 2025-02-08 ENCOUNTER — PHARMACY VISIT (OUTPATIENT)
Dept: PHARMACY | Facility: CLINIC | Age: 75
End: 2025-02-08
Payer: MEDICARE

## 2025-02-11 PROCEDURE — RXMED WILLOW AMBULATORY MEDICATION CHARGE

## 2025-02-17 ENCOUNTER — TELEPHONE (OUTPATIENT)
Dept: PRIMARY CARE | Facility: CLINIC | Age: 75
End: 2025-02-17
Payer: MEDICARE

## 2025-02-17 DIAGNOSIS — G47.00 INSOMNIA, UNSPECIFIED TYPE: ICD-10-CM

## 2025-02-17 RX ORDER — ZOLPIDEM TARTRATE 10 MG/1
10 TABLET ORAL NIGHTLY PRN
Qty: 30 TABLET | Refills: 2 | Status: SHIPPED | OUTPATIENT
Start: 2025-02-17 | End: 2025-08-20

## 2025-02-17 NOTE — TELEPHONE ENCOUNTER
Patient is requesting a refill on:    zolpidem (Ambien) 10 mg tablet   Route: TAKE 1 TABLET BY MOUTH AT BEDTIME AS NEEDED FOR SLEEP     Pharmacy:  [facility]     Last office visit: 11/27/2024  Next office visit: 3/25/2025

## 2025-02-18 PROCEDURE — RXMED WILLOW AMBULATORY MEDICATION CHARGE

## 2025-02-19 ENCOUNTER — PHARMACY VISIT (OUTPATIENT)
Dept: PHARMACY | Facility: CLINIC | Age: 75
End: 2025-02-19
Payer: MEDICARE

## 2025-02-28 PROCEDURE — RXMED WILLOW AMBULATORY MEDICATION CHARGE

## 2025-03-01 ENCOUNTER — PHARMACY VISIT (OUTPATIENT)
Dept: PHARMACY | Facility: CLINIC | Age: 75
End: 2025-03-01
Payer: MEDICARE

## 2025-03-06 DIAGNOSIS — F90.9 ATTENTION DEFICIT HYPERACTIVITY DISORDER (ADHD), UNSPECIFIED ADHD TYPE: ICD-10-CM

## 2025-03-06 PROCEDURE — RXMED WILLOW AMBULATORY MEDICATION CHARGE

## 2025-03-06 RX ORDER — DEXTROAMPHETAMINE SACCHARATE, AMPHETAMINE ASPARTATE MONOHYDRATE, DEXTROAMPHETAMINE SULFATE AND AMPHETAMINE SULFATE 5; 5; 5; 5 MG/1; MG/1; MG/1; MG/1
20 CAPSULE, EXTENDED RELEASE ORAL EVERY MORNING
Qty: 30 CAPSULE | Refills: 0 | Status: SHIPPED | OUTPATIENT
Start: 2025-03-06 | End: 2025-04-07

## 2025-03-08 ENCOUNTER — PHARMACY VISIT (OUTPATIENT)
Dept: PHARMACY | Facility: CLINIC | Age: 75
End: 2025-03-08
Payer: MEDICARE

## 2025-03-17 PROCEDURE — RXMED WILLOW AMBULATORY MEDICATION CHARGE

## 2025-03-19 ENCOUNTER — PHARMACY VISIT (OUTPATIENT)
Dept: PHARMACY | Facility: CLINIC | Age: 75
End: 2025-03-19
Payer: MEDICARE

## 2025-03-19 PROCEDURE — RXMED WILLOW AMBULATORY MEDICATION CHARGE

## 2025-03-24 LAB
ALBUMIN SERPL-MCNC: 4.3 G/DL (ref 3.6–5.1)
ALP SERPL-CCNC: 52 U/L (ref 35–144)
ALT SERPL-CCNC: 23 U/L (ref 9–46)
ANION GAP SERPL CALCULATED.4IONS-SCNC: 12 MMOL/L (CALC) (ref 7–17)
AST SERPL-CCNC: 24 U/L (ref 10–35)
BILIRUB SERPL-MCNC: 0.7 MG/DL (ref 0.2–1.2)
BUN SERPL-MCNC: 19 MG/DL (ref 7–25)
CALCIUM SERPL-MCNC: 9.2 MG/DL (ref 8.6–10.3)
CHLORIDE SERPL-SCNC: 102 MMOL/L (ref 98–110)
CHOLEST SERPL-MCNC: 148 MG/DL
CHOLEST/HDLC SERPL: 3.7 (CALC)
CO2 SERPL-SCNC: 24 MMOL/L (ref 20–32)
CREAT SERPL-MCNC: 1.03 MG/DL (ref 0.7–1.28)
EGFRCR SERPLBLD CKD-EPI 2021: 77 ML/MIN/1.73M2
GLUCOSE SERPL-MCNC: 136 MG/DL (ref 65–99)
HDLC SERPL-MCNC: 40 MG/DL
LDLC SERPL CALC-MCNC: 83 MG/DL (CALC)
NONHDLC SERPL-MCNC: 108 MG/DL (CALC)
POTASSIUM SERPL-SCNC: 4 MMOL/L (ref 3.5–5.3)
PROT SERPL-MCNC: 6.9 G/DL (ref 6.1–8.1)
SODIUM SERPL-SCNC: 138 MMOL/L (ref 135–146)
TRIGL SERPL-MCNC: 149 MG/DL

## 2025-03-25 ENCOUNTER — APPOINTMENT (OUTPATIENT)
Dept: PRIMARY CARE | Facility: CLINIC | Age: 75
End: 2025-03-25
Payer: MEDICARE

## 2025-03-25 VITALS
DIASTOLIC BLOOD PRESSURE: 67 MMHG | OXYGEN SATURATION: 94 % | HEART RATE: 74 BPM | SYSTOLIC BLOOD PRESSURE: 138 MMHG | BODY MASS INDEX: 28.34 KG/M2 | RESPIRATION RATE: 14 BRPM | WEIGHT: 239 LBS | TEMPERATURE: 98.4 F

## 2025-03-25 DIAGNOSIS — Z79.899 CONTROLLED SUBSTANCE AGREEMENT SIGNED: ICD-10-CM

## 2025-03-25 DIAGNOSIS — I10 BENIGN ESSENTIAL HYPERTENSION: ICD-10-CM

## 2025-03-25 DIAGNOSIS — E11.9 TYPE 2 DIABETES MELLITUS WITHOUT COMPLICATION, WITHOUT LONG-TERM CURRENT USE OF INSULIN: Primary | ICD-10-CM

## 2025-03-25 DIAGNOSIS — F90.9 ATTENTION DEFICIT HYPERACTIVITY DISORDER (ADHD), UNSPECIFIED ADHD TYPE: ICD-10-CM

## 2025-03-25 DIAGNOSIS — E78.2 MIXED HYPERLIPIDEMIA: ICD-10-CM

## 2025-03-25 DIAGNOSIS — F51.01 PRIMARY INSOMNIA: ICD-10-CM

## 2025-03-25 PROBLEM — C43.61 MALIGNANT MELANOMA OF RIGHT UPPER LIMB, INCLUDING SHOULDER: Status: ACTIVE | Noted: 2025-03-25

## 2025-03-25 PROBLEM — Z79.4 TYPE 2 DIABETES MELLITUS WITH HYPERGLYCEMIA, WITH LONG-TERM CURRENT USE OF INSULIN: Status: RESOLVED | Noted: 2023-01-31 | Resolved: 2025-03-25

## 2025-03-25 PROBLEM — E11.65 TYPE 2 DIABETES MELLITUS WITH HYPERGLYCEMIA, WITH LONG-TERM CURRENT USE OF INSULIN: Status: RESOLVED | Noted: 2023-01-31 | Resolved: 2025-03-25

## 2025-03-25 PROBLEM — C43.61 MALIGNANT MELANOMA OF RIGHT UPPER LIMB, INCLUDING SHOULDER: Status: RESOLVED | Noted: 2025-03-25 | Resolved: 2025-03-25

## 2025-03-25 PROBLEM — Z13.29 THYROID DISORDER SCREEN: Status: RESOLVED | Noted: 2023-07-05 | Resolved: 2025-03-25

## 2025-03-25 LAB
EST. AVERAGE GLUCOSE BLD GHB EST-MCNC: 146 MG/DL
EST. AVERAGE GLUCOSE BLD GHB EST-SCNC: 8.1 MMOL/L
HBA1C MFR BLD: 6.7 % OF TOTAL HGB
POC AMPHETAMINE: POSITIVE NG/ML
POC BARBITURATES: NEGATIVE NG/ML
POC BENZODIAZEPINES: NEGATIVE NG/ML
POC BUPRENORPHINE URINE: NEGATIVE NG/ML
POC COCAINE: NEGATIVE NG/ML
POC MDMA (NG/ML) IN URINE: NEGATIVE NG/ML
POC METHADONE MANUALLY ENTERED: NEGATIVE NG/ML
POC METHAMPHETAMINE: NEGATIVE NG/ML
POC MORPHINE URINE: NEGATIVE NG/ML
POC OPIATES: ABNORMAL NG/ML
POC OXYCODONE: NEGATIVE NG/ML
POC PHENCYCLIDINE (PCP): NEGATIVE NG/ML
POC THC: NEGATIVE NG/ML
POC TICYCLIC ANTIDEPRESSANTS (TCA): ABNORMAL NG/ML

## 2025-03-25 PROCEDURE — 1036F TOBACCO NON-USER: CPT | Performed by: FAMILY MEDICINE

## 2025-03-25 PROCEDURE — 1124F ACP DISCUSS-NO DSCNMKR DOCD: CPT | Performed by: FAMILY MEDICINE

## 2025-03-25 PROCEDURE — 4010F ACE/ARB THERAPY RXD/TAKEN: CPT | Performed by: FAMILY MEDICINE

## 2025-03-25 PROCEDURE — 3078F DIAST BP <80 MM HG: CPT | Performed by: FAMILY MEDICINE

## 2025-03-25 PROCEDURE — 1159F MED LIST DOCD IN RCRD: CPT | Performed by: FAMILY MEDICINE

## 2025-03-25 PROCEDURE — G2211 COMPLEX E/M VISIT ADD ON: HCPCS | Performed by: FAMILY MEDICINE

## 2025-03-25 PROCEDURE — 99214 OFFICE O/P EST MOD 30 MIN: CPT | Performed by: FAMILY MEDICINE

## 2025-03-25 PROCEDURE — 1160F RVW MEDS BY RX/DR IN RCRD: CPT | Performed by: FAMILY MEDICINE

## 2025-03-25 PROCEDURE — 80305 DRUG TEST PRSMV DIR OPT OBS: CPT | Performed by: FAMILY MEDICINE

## 2025-03-25 PROCEDURE — 3075F SYST BP GE 130 - 139MM HG: CPT | Performed by: FAMILY MEDICINE

## 2025-03-25 RX ORDER — METOPROLOL SUCCINATE 25 MG/1
TABLET, EXTENDED RELEASE ORAL
Qty: 90 TABLET | Refills: 1 | Status: SHIPPED | OUTPATIENT
Start: 2025-03-25 | End: 2026-03-25

## 2025-03-25 RX ORDER — FOSINOPRIL SODIUM 20 MG/1
20 TABLET ORAL DAILY
Qty: 90 TABLET | Refills: 1 | Status: SHIPPED | OUTPATIENT
Start: 2025-03-25

## 2025-03-25 RX ORDER — METFORMIN HYDROCHLORIDE 1000 MG/1
1000 TABLET ORAL 2 TIMES DAILY
Qty: 180 TABLET | Refills: 1 | Status: SHIPPED | OUTPATIENT
Start: 2025-03-25 | End: 2026-03-25

## 2025-03-25 RX ORDER — GLIPIZIDE 5 MG/1
5 TABLET ORAL DAILY
Qty: 90 TABLET | Refills: 1 | Status: SHIPPED | OUTPATIENT
Start: 2025-03-25 | End: 2025-03-25

## 2025-03-25 RX ORDER — METFORMIN HYDROCHLORIDE 1000 MG/1
1000 TABLET ORAL 2 TIMES DAILY
Qty: 180 TABLET | Refills: 1 | Status: SHIPPED | OUTPATIENT
Start: 2025-03-25 | End: 2025-03-25

## 2025-03-25 RX ORDER — CHLORTHALIDONE 25 MG/1
25 TABLET ORAL DAILY
Qty: 90 TABLET | Refills: 1 | Status: SHIPPED | OUTPATIENT
Start: 2025-03-25 | End: 2026-03-25

## 2025-03-25 RX ORDER — GLIPIZIDE 5 MG/1
5 TABLET ORAL DAILY
Qty: 90 TABLET | Refills: 1 | Status: SHIPPED | OUTPATIENT
Start: 2025-03-25 | End: 2026-03-25

## 2025-03-25 RX ORDER — SIMVASTATIN 10 MG/1
10 TABLET, FILM COATED ORAL EVERY EVENING
Qty: 90 TABLET | Refills: 1 | Status: SHIPPED | OUTPATIENT
Start: 2025-03-25

## 2025-03-25 ASSESSMENT — ENCOUNTER SYMPTOMS
SHORTNESS OF BREATH: 0
FEVER: 0
CONFUSION: 0
CHILLS: 0
PALPITATIONS: 0
CHEST TIGHTNESS: 0
ABDOMINAL PAIN: 0
ARTHRALGIAS: 0

## 2025-03-25 ASSESSMENT — PATIENT HEALTH QUESTIONNAIRE - PHQ9
SUM OF ALL RESPONSES TO PHQ9 QUESTIONS 1 AND 2: 0
1. LITTLE INTEREST OR PLEASURE IN DOING THINGS: NOT AT ALL
2. FEELING DOWN, DEPRESSED OR HOPELESS: NOT AT ALL

## 2025-03-25 NOTE — ASSESSMENT & PLAN NOTE
Continue Adderall XR 20 mg daily.  Patient does not feel he is able to wean down any lower he notices if he does miss a dose or 2 he seems to have breakthrough symptoms with difficulty focusing and staying on task.

## 2025-03-25 NOTE — ASSESSMENT & PLAN NOTE
A1c at 6.7%.  For now we will continue current medications reviewed A1c's and patient's weight over the past year there seems to be a direct correlation to his A1c and where his weights at.  We discussed diet exercise weight loss strategies in addition to continue the medication we will continue to monitor.

## 2025-03-25 NOTE — ASSESSMENT & PLAN NOTE
A1C 6.7%.  Reviewed A1c's and patient's weight over the past year seems to be a direct correlation to when his A1c was at its highest his weight was at its peak likewise when his A1c was at its lowest his weight was down.  He will continue current diabetic medications and work on lifestyle modifications with regards to diet exercise and weight loss.

## 2025-03-25 NOTE — PROGRESS NOTES
Subjective   Patient ID: Dale Schrader is a 74 y.o. male who presents for Follow-up (4 month).    HPI patient today for follow-up of ongoing healthcare issues and review recent lab work for most processes been doing okay.  He admits that there is room for improvement with regards to his diet and he has not been exercising like he typically does.  He is hoping to get back into cycling as the weather gets better and is considering joining a gym as well.  He is ADHD he says he feels like he goes back for most of his life he can see signs of his back when he was in school.  Currently on 20 mg of Adderall XR he does not feel he is ready to decrease it any further as he notices if he misses a dose or 2 he has difficulty concentrating staying on task.    Review of Systems   Constitutional:  Negative for chills and fever.   HENT:  Negative for congestion and ear pain.    Eyes:  Negative for visual disturbance.   Respiratory:  Negative for chest tightness and shortness of breath.    Cardiovascular:  Negative for chest pain and palpitations.   Gastrointestinal:  Negative for abdominal pain.   Musculoskeletal:  Negative for arthralgias.   Skin:  Negative for pallor.   Psychiatric/Behavioral:  Negative for confusion.        Objective   /67   Pulse 74   Temp 36.9 °C (98.4 °F)   Resp 14   Wt 108 kg (239 lb)   SpO2 94%   BMI 28.34 kg/m²     Physical Exam  Vitals and nursing note reviewed.   Constitutional:       General: He is not in acute distress.     Appearance: Normal appearance. He is not ill-appearing.   HENT:      Head: Normocephalic and atraumatic.      Right Ear: Tympanic membrane, ear canal and external ear normal.      Left Ear: Tympanic membrane, ear canal and external ear normal.      Mouth/Throat:      Pharynx: Oropharynx is clear.   Eyes:      Extraocular Movements: Extraocular movements intact.   Cardiovascular:      Rate and Rhythm: Normal rate and regular rhythm.      Pulses: Normal pulses.       Heart sounds: Normal heart sounds.   Pulmonary:      Effort: Pulmonary effort is normal.      Breath sounds: Normal breath sounds.   Abdominal:      General: Abdomen is flat. Bowel sounds are normal.      Palpations: Abdomen is soft.      Tenderness: There is no abdominal tenderness.   Musculoskeletal:         General: Normal range of motion.      Cervical back: Neck supple.   Skin:     General: Skin is warm.   Neurological:      Mental Status: He is alert and oriented to person, place, and time. Mental status is at baseline.   Psychiatric:         Mood and Affect: Mood normal.       Recent Results (from the past 6 weeks)   Lipid Panel    Collection Time: 03/24/25  7:39 AM   Result Value Ref Range    CHOLESTEROL, TOTAL 148 <200 mg/dL    HDL CHOLESTEROL 40 > OR = 40 mg/dL    TRIGLYCERIDES 149 <150 mg/dL    LDL-CHOLESTEROL 83 mg/dL (calc)    CHOL/HDLC RATIO 3.7 <5.0 (calc)    NON HDL CHOLESTEROL 108 <130 mg/dL (calc)   Comprehensive Metabolic Panel    Collection Time: 03/24/25  7:39 AM   Result Value Ref Range    GLUCOSE 136 (H) 65 - 99 mg/dL    UREA NITROGEN (BUN) 19 7 - 25 mg/dL    CREATININE 1.03 0.70 - 1.28 mg/dL    EGFR 77 > OR = 60 mL/min/1.73m2    SODIUM 138 135 - 146 mmol/L    POTASSIUM 4.0 3.5 - 5.3 mmol/L    CHLORIDE 102 98 - 110 mmol/L    CARBON DIOXIDE 24 20 - 32 mmol/L    ELECTROLYTE BALANCE 12 7 - 17 mmol/L (calc)    CALCIUM 9.2 8.6 - 10.3 mg/dL    PROTEIN, TOTAL 6.9 6.1 - 8.1 g/dL    ALBUMIN 4.3 3.6 - 5.1 g/dL    BILIRUBIN, TOTAL 0.7 0.2 - 1.2 mg/dL    ALKALINE PHOSPHATASE 52 35 - 144 U/L    AST 24 10 - 35 U/L    ALT 23 9 - 46 U/L   Hemoglobin A1C    Collection Time: 03/24/25  7:39 AM   Result Value Ref Range    HEMOGLOBIN A1c 6.7 (H) <5.7 % of total Hgb    eAG (mg/dL) 146 mg/dL    eAG (mmol/L) 8.1 mmol/L   POCT waived urine drug screen manually resulted    Collection Time: 03/25/25 10:20 AM   Result Value Ref Range    POC THC Negative Negative, Not applicable ng/mL    POC Cocaine Negative Negative, Not  applicable ng/mL    POC Opiates Not applicable Negative, Not applicable ng/mL    POC Amphetamine Positive (A) Negative, Not applicable ng/mL    POC Phencyclidine (PCP) Negative Negative, Not applicable ng/mL    POC Barbiturates Negative Negative, Not applicable ng/mL    POC Benzodiazepines Negative Negative, Not applicable ng/mL    POC Methamphetamine Negative Negative, Not applicable ng/mL    POC METHADONE MANUALLY ENTERED Negative Negative, Not applicable ng/mL    POC Ticyclic Antidepressants (TCA) Not applicable Negative, Not applicable ng/mL    POC Oxycodone Negative Negative, Not applicable ng/mL    POC MDMA URINE Negative Negative, Not applicable ng/mL    POC Morphine Urine Negative Negative, Not applicable ng/mL    POC Burprenorphine Urine Negative Negative, Not applicable ng/mL     Recent labs reviewed with patient  Continue current medications work on low-fat low-cholesterol diabetic diet    Controlled substance agreement signed urine drug screen completed  We will continue his other regular medications.    He is to return to our office in 4 months with repeat fasting labs      Assessment/Plan   Problem List Items Addressed This Visit             ICD-10-CM    ADHD F90.9     Continue Adderall XR 20 mg daily.  Patient does not feel he is able to wean down any lower he notices if he does miss a dose or 2 he seems to have breakthrough symptoms with difficulty focusing and staying on task.         Relevant Orders    Follow Up In Primary Care - Established    Benign essential hypertension I10     Stable continue current treatment         Relevant Medications    chlorthalidone (Hygroton) 25 mg tablet    fosinopril (Monopril) 20 mg tablet    metoprolol succinate XL (Toprol-XL) 25 mg 24 hr tablet    Other Relevant Orders    Comprehensive Metabolic Panel    Follow Up In Primary Care - Established    Hyperlipidemia E78.5     Continue simvastatin 10 mg daily along with dietary modifications         Relevant Medications     simvastatin (Zocor) 10 mg tablet    Other Relevant Orders    Comprehensive Metabolic Panel    Lipid Panel    Follow Up In Primary Care - Established    Insomnia G47.00     Continues Ambien 10 mg nightly as needed.         Controlled substance agreement signed Z79.899     Urine drug screen    I have personally reviewed the OARRS report.  I have considered the risks of abuse,dependence, addiction and diversion.  I believe it is clinically appropriate to prescribe this medication.            Relevant Orders    POCT waived urine drug screen manually resulted (Completed)    Type 2 diabetes mellitus without complication, without long-term current use of insulin (Multi) - Primary E11.9     A1C 6.7%.  Reviewed A1c's and patient's weight over the past year seems to be a direct correlation to when his A1c was at its highest his weight was at its peak likewise when his A1c was at its lowest his weight was down.  He will continue current diabetic medications and work on lifestyle modifications with regards to diet exercise and weight loss.         Relevant Medications    empagliflozin (Jardiance) 10 mg    metFORMIN (Glucophage) 1,000 mg tablet    glipiZIDE (Glucotrol) 5 mg tablet    Other Relevant Orders    Hemoglobin A1C

## 2025-04-07 ENCOUNTER — TELEPHONE (OUTPATIENT)
Dept: PRIMARY CARE | Facility: CLINIC | Age: 75
End: 2025-04-07
Payer: MEDICARE

## 2025-04-07 DIAGNOSIS — F90.9 ATTENTION DEFICIT HYPERACTIVITY DISORDER (ADHD), UNSPECIFIED ADHD TYPE: ICD-10-CM

## 2025-04-07 PROCEDURE — RXMED WILLOW AMBULATORY MEDICATION CHARGE

## 2025-04-07 RX ORDER — DEXTROAMPHETAMINE SACCHARATE, AMPHETAMINE ASPARTATE MONOHYDRATE, DEXTROAMPHETAMINE SULFATE AND AMPHETAMINE SULFATE 5; 5; 5; 5 MG/1; MG/1; MG/1; MG/1
20 CAPSULE, EXTENDED RELEASE ORAL EVERY MORNING
Qty: 30 CAPSULE | Refills: 0 | Status: SHIPPED | OUTPATIENT
Start: 2025-04-07 | End: 2025-05-10

## 2025-04-07 NOTE — TELEPHONE ENCOUNTER
Dale called to get a refill on the following medication:    Amphetamine-dextroamphetamine XR 20 mg 1 capsule daily    [facility]: [facility]     Last seen: 3/25/2025  Future appointment: 7/30/2025

## 2025-04-10 ENCOUNTER — PHARMACY VISIT (OUTPATIENT)
Dept: PHARMACY | Facility: CLINIC | Age: 75
End: 2025-04-10
Payer: MEDICARE

## 2025-04-18 PROCEDURE — RXMED WILLOW AMBULATORY MEDICATION CHARGE

## 2025-04-19 ENCOUNTER — PHARMACY VISIT (OUTPATIENT)
Dept: PHARMACY | Facility: CLINIC | Age: 75
End: 2025-04-19
Payer: MEDICARE

## 2025-05-08 DIAGNOSIS — F90.9 ATTENTION DEFICIT HYPERACTIVITY DISORDER (ADHD), UNSPECIFIED ADHD TYPE: ICD-10-CM

## 2025-05-08 PROCEDURE — RXMED WILLOW AMBULATORY MEDICATION CHARGE

## 2025-05-08 RX ORDER — DEXTROAMPHETAMINE SACCHARATE, AMPHETAMINE ASPARTATE MONOHYDRATE, DEXTROAMPHETAMINE SULFATE AND AMPHETAMINE SULFATE 5; 5; 5; 5 MG/1; MG/1; MG/1; MG/1
20 CAPSULE, EXTENDED RELEASE ORAL EVERY MORNING
Qty: 30 CAPSULE | Refills: 0 | Status: SHIPPED | OUTPATIENT
Start: 2025-05-08 | End: 2025-06-08

## 2025-05-09 ENCOUNTER — PHARMACY VISIT (OUTPATIENT)
Dept: PHARMACY | Facility: CLINIC | Age: 75
End: 2025-05-09
Payer: MEDICARE

## 2025-05-13 PROCEDURE — RXMED WILLOW AMBULATORY MEDICATION CHARGE

## 2025-05-19 PROCEDURE — RXMED WILLOW AMBULATORY MEDICATION CHARGE

## 2025-05-20 ENCOUNTER — TELEPHONE (OUTPATIENT)
Dept: PRIMARY CARE | Facility: CLINIC | Age: 75
End: 2025-05-20
Payer: MEDICARE

## 2025-05-20 DIAGNOSIS — G47.00 INSOMNIA, UNSPECIFIED TYPE: ICD-10-CM

## 2025-05-20 PROCEDURE — RXMED WILLOW AMBULATORY MEDICATION CHARGE

## 2025-05-20 RX ORDER — ZOLPIDEM TARTRATE 10 MG/1
10 TABLET ORAL NIGHTLY PRN
Qty: 30 TABLET | Refills: 2 | Status: SHIPPED | OUTPATIENT
Start: 2025-05-20 | End: 2025-11-20

## 2025-05-20 NOTE — TELEPHONE ENCOUNTER
Patient is requesting a refill on:    zolpidem (Ambien) 10 mg tablet    Route: TAKE 1 TABLET BY MOUTH AT BEDTIME AS NEEDED FOR SLEEP      [facility]     Last office visit:  3/25/2025    Next office visit:  7/30/2025

## 2025-05-21 ENCOUNTER — PHARMACY VISIT (OUTPATIENT)
Dept: PHARMACY | Facility: CLINIC | Age: 75
End: 2025-05-21
Payer: MEDICARE

## 2025-06-09 ENCOUNTER — TELEPHONE (OUTPATIENT)
Dept: PRIMARY CARE | Facility: CLINIC | Age: 75
End: 2025-06-09
Payer: MEDICARE

## 2025-06-09 DIAGNOSIS — F90.9 ATTENTION DEFICIT HYPERACTIVITY DISORDER (ADHD), UNSPECIFIED ADHD TYPE: ICD-10-CM

## 2025-06-09 PROCEDURE — RXMED WILLOW AMBULATORY MEDICATION CHARGE

## 2025-06-09 RX ORDER — DEXTROAMPHETAMINE SACCHARATE, AMPHETAMINE ASPARTATE MONOHYDRATE, DEXTROAMPHETAMINE SULFATE AND AMPHETAMINE SULFATE 5; 5; 5; 5 MG/1; MG/1; MG/1; MG/1
20 CAPSULE, EXTENDED RELEASE ORAL EVERY MORNING
Qty: 30 CAPSULE | Refills: 0 | Status: SHIPPED | OUTPATIENT
Start: 2025-06-09 | End: 2025-07-10

## 2025-06-09 NOTE — TELEPHONE ENCOUNTER
Dale called to get a refill on the following medication:    Amphetamine-dextroamphetamine XR 1 capsule daily    Pharmacy:  [facility]    Last seen: 3/25/2025  Future appointment: 7/30/2025

## 2025-06-10 ENCOUNTER — PHARMACY VISIT (OUTPATIENT)
Dept: PHARMACY | Facility: CLINIC | Age: 75
End: 2025-06-10
Payer: MEDICARE

## 2025-06-10 PROCEDURE — RXMED WILLOW AMBULATORY MEDICATION CHARGE

## 2025-06-17 PROCEDURE — RXMED WILLOW AMBULATORY MEDICATION CHARGE

## 2025-06-20 PROCEDURE — RXMED WILLOW AMBULATORY MEDICATION CHARGE

## 2025-06-21 ENCOUNTER — PHARMACY VISIT (OUTPATIENT)
Dept: PHARMACY | Facility: CLINIC | Age: 75
End: 2025-06-21
Payer: MEDICARE

## 2025-06-25 DIAGNOSIS — I10 BENIGN ESSENTIAL HYPERTENSION: ICD-10-CM

## 2025-06-25 DIAGNOSIS — E78.2 MIXED HYPERLIPIDEMIA: ICD-10-CM

## 2025-06-25 DIAGNOSIS — E11.9 TYPE 2 DIABETES MELLITUS WITHOUT COMPLICATION, WITHOUT LONG-TERM CURRENT USE OF INSULIN: ICD-10-CM

## 2025-07-09 DIAGNOSIS — F90.9 ATTENTION DEFICIT HYPERACTIVITY DISORDER (ADHD), UNSPECIFIED ADHD TYPE: ICD-10-CM

## 2025-07-10 PROCEDURE — RXMED WILLOW AMBULATORY MEDICATION CHARGE

## 2025-07-10 RX ORDER — DEXTROAMPHETAMINE SACCHARATE, AMPHETAMINE ASPARTATE MONOHYDRATE, DEXTROAMPHETAMINE SULFATE AND AMPHETAMINE SULFATE 5; 5; 5; 5 MG/1; MG/1; MG/1; MG/1
20 CAPSULE, EXTENDED RELEASE ORAL EVERY MORNING
Qty: 30 CAPSULE | Refills: 0 | Status: SHIPPED | OUTPATIENT
Start: 2025-07-10 | End: 2025-08-10

## 2025-07-11 ENCOUNTER — PHARMACY VISIT (OUTPATIENT)
Dept: PHARMACY | Facility: CLINIC | Age: 75
End: 2025-07-11
Payer: MEDICARE

## 2025-07-21 ENCOUNTER — PHARMACY VISIT (OUTPATIENT)
Dept: PHARMACY | Facility: CLINIC | Age: 75
End: 2025-07-21
Payer: MEDICARE

## 2025-07-21 PROCEDURE — RXMED WILLOW AMBULATORY MEDICATION CHARGE

## 2025-07-30 ENCOUNTER — APPOINTMENT (OUTPATIENT)
Dept: PRIMARY CARE | Facility: CLINIC | Age: 75
End: 2025-07-30
Payer: MEDICARE

## 2025-07-30 VITALS
BODY MASS INDEX: 28.37 KG/M2 | SYSTOLIC BLOOD PRESSURE: 133 MMHG | RESPIRATION RATE: 14 BRPM | HEIGHT: 76 IN | TEMPERATURE: 98 F | OXYGEN SATURATION: 94 % | HEART RATE: 80 BPM | WEIGHT: 233 LBS | DIASTOLIC BLOOD PRESSURE: 87 MMHG

## 2025-07-30 DIAGNOSIS — E55.9 VITAMIN D DEFICIENCY: ICD-10-CM

## 2025-07-30 DIAGNOSIS — E78.2 MIXED HYPERLIPIDEMIA: ICD-10-CM

## 2025-07-30 DIAGNOSIS — Z12.5 SCREENING FOR PROSTATE CANCER: ICD-10-CM

## 2025-07-30 DIAGNOSIS — I10 BENIGN ESSENTIAL HYPERTENSION: ICD-10-CM

## 2025-07-30 DIAGNOSIS — R53.83 OTHER FATIGUE: ICD-10-CM

## 2025-07-30 DIAGNOSIS — E11.9 TYPE 2 DIABETES MELLITUS WITHOUT COMPLICATION, WITHOUT LONG-TERM CURRENT USE OF INSULIN: ICD-10-CM

## 2025-07-30 DIAGNOSIS — Z00.00 ANNUAL PHYSICAL EXAM: ICD-10-CM

## 2025-07-30 DIAGNOSIS — Z00.00 MEDICARE ANNUAL WELLNESS VISIT, SUBSEQUENT: Primary | ICD-10-CM

## 2025-07-30 DIAGNOSIS — G47.00 INSOMNIA, UNSPECIFIED TYPE: ICD-10-CM

## 2025-07-30 DIAGNOSIS — F90.9 ATTENTION DEFICIT HYPERACTIVITY DISORDER (ADHD), UNSPECIFIED ADHD TYPE: ICD-10-CM

## 2025-07-30 LAB
ALBUMIN SERPL-MCNC: 4.6 G/DL (ref 3.6–5.1)
ALP SERPL-CCNC: 54 U/L (ref 35–144)
ALT SERPL-CCNC: 33 U/L (ref 9–46)
ANION GAP SERPL CALCULATED.4IONS-SCNC: 13 MMOL/L (CALC) (ref 7–17)
AST SERPL-CCNC: 28 U/L (ref 10–35)
BILIRUB SERPL-MCNC: 0.7 MG/DL (ref 0.2–1.2)
BUN SERPL-MCNC: 21 MG/DL (ref 7–25)
CALCIUM SERPL-MCNC: 9.2 MG/DL (ref 8.6–10.3)
CHLORIDE SERPL-SCNC: 101 MMOL/L (ref 98–110)
CHOLEST SERPL-MCNC: 138 MG/DL
CHOLEST/HDLC SERPL: 3.2 (CALC)
CO2 SERPL-SCNC: 24 MMOL/L (ref 20–32)
CREAT SERPL-MCNC: 1.1 MG/DL (ref 0.7–1.28)
EGFRCR SERPLBLD CKD-EPI 2021: 71 ML/MIN/1.73M2
EST. AVERAGE GLUCOSE BLD GHB EST-MCNC: 146 MG/DL
EST. AVERAGE GLUCOSE BLD GHB EST-SCNC: 8.1 MMOL/L
GLUCOSE SERPL-MCNC: 103 MG/DL (ref 65–99)
HBA1C MFR BLD: 6.7 %
HDLC SERPL-MCNC: 43 MG/DL
LDLC SERPL CALC-MCNC: 70 MG/DL (CALC)
NONHDLC SERPL-MCNC: 95 MG/DL (CALC)
POTASSIUM SERPL-SCNC: 4.3 MMOL/L (ref 3.5–5.3)
PROT SERPL-MCNC: 7.5 G/DL (ref 6.1–8.1)
SODIUM SERPL-SCNC: 138 MMOL/L (ref 135–146)
TRIGL SERPL-MCNC: 174 MG/DL

## 2025-07-30 PROCEDURE — 3008F BODY MASS INDEX DOCD: CPT | Performed by: FAMILY MEDICINE

## 2025-07-30 PROCEDURE — 1036F TOBACCO NON-USER: CPT | Performed by: FAMILY MEDICINE

## 2025-07-30 PROCEDURE — G0439 PPPS, SUBSEQ VISIT: HCPCS | Performed by: FAMILY MEDICINE

## 2025-07-30 PROCEDURE — 99214 OFFICE O/P EST MOD 30 MIN: CPT | Performed by: FAMILY MEDICINE

## 2025-07-30 PROCEDURE — 3079F DIAST BP 80-89 MM HG: CPT | Performed by: FAMILY MEDICINE

## 2025-07-30 PROCEDURE — 1170F FXNL STATUS ASSESSED: CPT | Performed by: FAMILY MEDICINE

## 2025-07-30 PROCEDURE — 99397 PER PM REEVAL EST PAT 65+ YR: CPT | Performed by: FAMILY MEDICINE

## 2025-07-30 PROCEDURE — 1160F RVW MEDS BY RX/DR IN RCRD: CPT | Performed by: FAMILY MEDICINE

## 2025-07-30 PROCEDURE — RXMED WILLOW AMBULATORY MEDICATION CHARGE

## 2025-07-30 PROCEDURE — 3075F SYST BP GE 130 - 139MM HG: CPT | Performed by: FAMILY MEDICINE

## 2025-07-30 PROCEDURE — 1123F ACP DISCUSS/DSCN MKR DOCD: CPT | Performed by: FAMILY MEDICINE

## 2025-07-30 PROCEDURE — 4010F ACE/ARB THERAPY RXD/TAKEN: CPT | Performed by: FAMILY MEDICINE

## 2025-07-30 PROCEDURE — 1159F MED LIST DOCD IN RCRD: CPT | Performed by: FAMILY MEDICINE

## 2025-07-30 RX ORDER — METOPROLOL SUCCINATE 25 MG/1
TABLET, EXTENDED RELEASE ORAL
Qty: 90 TABLET | Refills: 1 | Status: SHIPPED | OUTPATIENT
Start: 2025-07-30 | End: 2026-07-30

## 2025-07-30 RX ORDER — ZOLPIDEM TARTRATE 10 MG/1
10 TABLET ORAL NIGHTLY PRN
Qty: 30 TABLET | Refills: 2 | Status: SHIPPED | OUTPATIENT
Start: 2025-08-20 | End: 2026-02-20

## 2025-07-30 RX ORDER — GLIPIZIDE 5 MG/1
5 TABLET ORAL DAILY
Qty: 90 TABLET | Refills: 1 | Status: SHIPPED | OUTPATIENT
Start: 2025-07-30 | End: 2026-07-30

## 2025-07-30 RX ORDER — CHLORTHALIDONE 25 MG/1
25 TABLET ORAL DAILY
Qty: 90 TABLET | Refills: 1 | Status: SHIPPED | OUTPATIENT
Start: 2025-07-30 | End: 2026-07-30

## 2025-07-30 RX ORDER — SIMVASTATIN 10 MG/1
10 TABLET, FILM COATED ORAL EVERY EVENING
Qty: 90 TABLET | Refills: 1 | Status: SHIPPED | OUTPATIENT
Start: 2025-07-30

## 2025-07-30 RX ORDER — FOSINOPRIL SODIUM 20 MG/1
20 TABLET ORAL DAILY
Qty: 90 TABLET | Refills: 1 | Status: SHIPPED | OUTPATIENT
Start: 2025-07-30

## 2025-07-30 RX ORDER — DEXTROAMPHETAMINE SACCHARATE, AMPHETAMINE ASPARTATE MONOHYDRATE, DEXTROAMPHETAMINE SULFATE AND AMPHETAMINE SULFATE 5; 5; 5; 5 MG/1; MG/1; MG/1; MG/1
20 CAPSULE, EXTENDED RELEASE ORAL EVERY MORNING
Qty: 30 CAPSULE | Refills: 0 | Status: SHIPPED | OUTPATIENT
Start: 2025-08-09 | End: 2025-09-08

## 2025-07-30 RX ORDER — METFORMIN HYDROCHLORIDE 1000 MG/1
1000 TABLET ORAL 2 TIMES DAILY
Qty: 180 TABLET | Refills: 1 | Status: SHIPPED | OUTPATIENT
Start: 2025-07-30 | End: 2026-07-30

## 2025-07-30 ASSESSMENT — PATIENT HEALTH QUESTIONNAIRE - PHQ9
7. TROUBLE CONCENTRATING ON THINGS, SUCH AS READING THE NEWSPAPER OR WATCHING TELEVISION: NOT AT ALL
2. FEELING DOWN, DEPRESSED OR HOPELESS: SEVERAL DAYS
4. FEELING TIRED OR HAVING LITTLE ENERGY: SEVERAL DAYS
SUM OF ALL RESPONSES TO PHQ QUESTIONS 1-9: 3
6. FEELING BAD ABOUT YOURSELF - OR THAT YOU ARE A FAILURE OR HAVE LET YOURSELF OR YOUR FAMILY DOWN: NOT AT ALL
SUM OF ALL RESPONSES TO PHQ9 QUESTIONS 1 AND 2: 2
5. POOR APPETITE OR OVEREATING: NOT AT ALL
3. TROUBLE FALLING OR STAYING ASLEEP OR SLEEPING TOO MUCH: NOT AT ALL
9. THOUGHTS THAT YOU WOULD BE BETTER OFF DEAD, OR OF HURTING YOURSELF: NOT AT ALL
1. LITTLE INTEREST OR PLEASURE IN DOING THINGS: SEVERAL DAYS
8. MOVING OR SPEAKING SO SLOWLY THAT OTHER PEOPLE COULD HAVE NOTICED. OR THE OPPOSITE, BEING SO FIGETY OR RESTLESS THAT YOU HAVE BEEN MOVING AROUND A LOT MORE THAN USUAL: NOT AT ALL

## 2025-07-30 ASSESSMENT — ENCOUNTER SYMPTOMS
CONFUSION: 0
CHILLS: 0
ARTHRALGIAS: 0
SHORTNESS OF BREATH: 0
PALPITATIONS: 0
CHEST TIGHTNESS: 0
FEVER: 0
ABDOMINAL PAIN: 0

## 2025-07-30 ASSESSMENT — ACTIVITIES OF DAILY LIVING (ADL)
DRESSING: INDEPENDENT
MANAGING_FINANCES: INDEPENDENT
TAKING_MEDICATION: INDEPENDENT
BATHING: INDEPENDENT
GROCERY_SHOPPING: INDEPENDENT
DOING_HOUSEWORK: INDEPENDENT

## 2025-07-30 NOTE — ASSESSMENT & PLAN NOTE
Clinically stable continue Adderall XR 20 mg daily    Needed refills provided    I have personally reviewed the OARRS report.  I have considered the risks of abuse,dependence, addiction and diversion.  I believe it is clinically appropriate to prescribe this medication.       Orders:    Follow Up In Primary Care - Established    amphetamine-dextroamphetamine XR (Adderall XR) 20 mg 24 hr capsule; Take 1 capsule (20 mg) by mouth once daily in the morning. Do not crush or chew. Do not fill before August 9, 2025.    Follow Up In Primary Care - Established; Future

## 2025-07-30 NOTE — ASSESSMENT & PLAN NOTE
Stable refill Ambien 10 mg nightly as needed    I have personally reviewed the OARRS report.  I have considered the risks of abuse,dependence, addiction and diversion.  I believe it is clinically appropriate to prescribe this medication.       Orders:    zolpidem (Ambien) 10 mg tablet; TAKE 1 TABLET BY MOUTH AT BEDTIME AS NEEDED FOR SLEEP    Follow Up In Primary Care - Established; Future

## 2025-07-30 NOTE — ASSESSMENT & PLAN NOTE
Stable continue simvastatin 10 mg nightly    Orders:    Follow Up In Primary Care - Established    simvastatin (Zocor) 10 mg tablet; Take 1 tablet (10 mg) by mouth once daily in the evening.    CBC; Future    Comprehensive Metabolic Panel; Future    Lipid Panel; Future    Follow Up In Primary Care - Established; Future

## 2025-07-30 NOTE — ASSESSMENT & PLAN NOTE
Recent labs reviewed    Colon cancer screening up-to-date    Immunization recommendations reviewed

## 2025-07-30 NOTE — ASSESSMENT & PLAN NOTE
Stable continue current treatment    Orders:    Follow Up In Primary Care - Established    chlorthalidone (Hygroton) 25 mg tablet; TAKE 1 TABLET BY MOUTH ONCE DAILY    fosinopril (Monopril) 20 mg tablet; Take 1 tablet (20 mg) by mouth once daily.    metoprolol succinate XL (Toprol-XL) 25 mg 24 hr tablet; TAKE 1 TABLET BY MOUTH AT BEDTIME. DO NOT CRUSH OR CHEW    CBC; Future    Comprehensive Metabolic Panel; Future    Follow Up In Primary Care - Established; Future

## 2025-07-30 NOTE — ASSESSMENT & PLAN NOTE
A1c steady at 6.7% continue current treatment and dietary modifications    Orders:    empagliflozin (Jardiance) 10 mg tablet; Take 1 tablet (10 mg) by mouth once daily.    glipiZIDE (Glucotrol) 5 mg tablet; TAKE 1 TABLET BY MOUTH ONCE DAILY    metFORMIN (Glucophage) 1,000 mg tablet; Take 1 tablet (1,000 mg) by mouth 2 times a day.    CBC; Future    Comprehensive Metabolic Panel; Future    Hemoglobin A1C; Future    Albumin-Creatinine Ratio, Urine Random; Future    Follow Up In Primary Care - Established; Future

## 2025-07-30 NOTE — ASSESSMENT & PLAN NOTE
Recent labs reviewed    Colon cancer screening up-to-date    PSA up-to-date    Immunization recommendations reviewed

## 2025-07-30 NOTE — PROGRESS NOTES
"Subjective   Reason for Visit: Dale Schrader is an 75 y.o. male here for a Medicare Wellness visit.     Past Medical, Surgical, and Family History reviewed and updated in chart.    Reviewed all medications by prescribing practitioner or clinical pharmacist (such as prescriptions, OTCs, herbal therapies and supplements) and documented in the medical record.    HPI patient today for follow-up of ongoing healthcare issues and review of lab work for most processes been feeling good.  He continues to work on diet and exercise.  But admits there is still room for improvement.  He feels the current dose of Adderall XR is working well with regards to his ADHD and he would like to continue.  He only uses the Ambien on an as-needed basis for sleep.    Patient Care Team:  Dyllan Hernandez MD as PCP - General  Dyllan Hernandez MD as PCP - [employer] PCP     Review of Systems   Constitutional:  Negative for chills and fever.   HENT:  Negative for congestion and ear pain.    Eyes:  Negative for visual disturbance.   Respiratory:  Negative for chest tightness and shortness of breath.    Cardiovascular:  Negative for chest pain and palpitations.   Gastrointestinal:  Negative for abdominal pain.   Musculoskeletal:  Negative for arthralgias.   Skin:  Negative for pallor.   Psychiatric/Behavioral:  Negative for confusion.        Objective   Vitals:  /87   Pulse 80   Temp 36.7 °C (98 °F)   Resp 14   Ht 1.93 m (6' 4\")   Wt 106 kg (233 lb)   SpO2 94%   BMI 28.36 kg/m²       Physical Exam  Vitals and nursing note reviewed.   Constitutional:       General: He is not in acute distress.     Appearance: Normal appearance. He is not ill-appearing.   HENT:      Head: Normocephalic and atraumatic.      Right Ear: Tympanic membrane, ear canal and external ear normal.      Left Ear: Tympanic membrane, ear canal and external ear normal.      Mouth/Throat:      Pharynx: Oropharynx is clear.     Eyes:      " Extraocular Movements: Extraocular movements intact.       Cardiovascular:      Rate and Rhythm: Normal rate and regular rhythm.      Pulses: Normal pulses.      Heart sounds: Normal heart sounds.   Pulmonary:      Effort: Pulmonary effort is normal.      Breath sounds: Normal breath sounds.   Abdominal:      General: Abdomen is flat. Bowel sounds are normal.      Palpations: Abdomen is soft.      Tenderness: There is no abdominal tenderness.     Musculoskeletal:         General: Normal range of motion.      Cervical back: Neck supple.     Skin:     General: Skin is warm.     Neurological:      Mental Status: He is alert and oriented to person, place, and time. Mental status is at baseline.     Psychiatric:         Mood and Affect: Mood normal.       Recent Results (from the past 6 weeks)   Comprehensive Metabolic Panel    Collection Time: 07/29/25 12:08 PM   Result Value Ref Range    GLUCOSE 103 (H) 65 - 99 mg/dL    UREA NITROGEN (BUN) 21 7 - 25 mg/dL    CREATININE 1.10 0.70 - 1.28 mg/dL    EGFR 71 > OR = 60 mL/min/1.73m2    SODIUM 138 135 - 146 mmol/L    POTASSIUM 4.3 3.5 - 5.3 mmol/L    CHLORIDE 101 98 - 110 mmol/L    CARBON DIOXIDE 24 20 - 32 mmol/L    ELECTROLYTE BALANCE 13 7 - 17 mmol/L (calc)    CALCIUM 9.2 8.6 - 10.3 mg/dL    PROTEIN, TOTAL 7.5 6.1 - 8.1 g/dL    ALBUMIN 4.6 3.6 - 5.1 g/dL    BILIRUBIN, TOTAL 0.7 0.2 - 1.2 mg/dL    ALKALINE PHOSPHATASE 54 35 - 144 U/L    AST 28 10 - 35 U/L    ALT 33 9 - 46 U/L   Lipid Panel    Collection Time: 07/29/25 12:08 PM   Result Value Ref Range    CHOLESTEROL, TOTAL 138 <200 mg/dL    HDL CHOLESTEROL 43 > OR = 40 mg/dL    TRIGLYCERIDES 174 (H) <150 mg/dL    LDL-CHOLESTEROL 70 mg/dL (calc)    CHOL/HDLC RATIO 3.2 <5.0 (calc)    NON HDL CHOLESTEROL 95 <130 mg/dL (calc)   Hemoglobin A1C    Collection Time: 07/29/25 12:10 PM   Result Value Ref Range    HEMOGLOBIN A1c 6.7 (H) <5.7 %    eAG (mg/dL) 146 mg/dL    eAG (mmol/L) 8.1 mmol/L     Recent labs reviewed overall numbers  are stable for patient and will continue current medications and follow low-fat low-cholesterol diabetic diet    Keep annual diabetic eye exam    Needed refills provide    Return to our office 4 months with repeat fasting labs    Assessment & Plan  Medicare annual wellness visit, subsequent  Recent labs reviewed    Colon cancer screening up-to-date    PSA up-to-date    Immunization recommendations reviewed         Annual physical exam  Recent labs reviewed    Colon cancer screening up-to-date    Immunization recommendations reviewed         Type 2 diabetes mellitus without complication, without long-term current use of insulin  A1c steady at 6.7% continue current treatment and dietary modifications    Orders:    empagliflozin (Jardiance) 10 mg tablet; Take 1 tablet (10 mg) by mouth once daily.    glipiZIDE (Glucotrol) 5 mg tablet; TAKE 1 TABLET BY MOUTH ONCE DAILY    metFORMIN (Glucophage) 1,000 mg tablet; Take 1 tablet (1,000 mg) by mouth 2 times a day.    CBC; Future    Comprehensive Metabolic Panel; Future    Hemoglobin A1C; Future    Albumin-Creatinine Ratio, Urine Random; Future    Follow Up In Primary Care - Established; Future    Benign essential hypertension  Stable continue current treatment    Orders:    Follow Up In Primary Care - Established    chlorthalidone (Hygroton) 25 mg tablet; TAKE 1 TABLET BY MOUTH ONCE DAILY    fosinopril (Monopril) 20 mg tablet; Take 1 tablet (20 mg) by mouth once daily.    metoprolol succinate XL (Toprol-XL) 25 mg 24 hr tablet; TAKE 1 TABLET BY MOUTH AT BEDTIME. DO NOT CRUSH OR CHEW    CBC; Future    Comprehensive Metabolic Panel; Future    Follow Up In Primary Care - Established; Future    Mixed hyperlipidemia  Stable continue simvastatin 10 mg nightly    Orders:    Follow Up In Primary Care - Established    simvastatin (Zocor) 10 mg tablet; Take 1 tablet (10 mg) by mouth once daily in the evening.    CBC; Future    Comprehensive Metabolic Panel; Future    Lipid Panel;  Future    Follow Up In Primary Care - Established; Future    Attention deficit hyperactivity disorder (ADHD), unspecified ADHD type  Clinically stable continue Adderall XR 20 mg daily    Needed refills provided    I have personally reviewed the OARRS report.  I have considered the risks of abuse,dependence, addiction and diversion.  I believe it is clinically appropriate to prescribe this medication.       Orders:    Follow Up In Primary Care - Established    amphetamine-dextroamphetamine XR (Adderall XR) 20 mg 24 hr capsule; Take 1 capsule (20 mg) by mouth once daily in the morning. Do not crush or chew. Do not fill before August 9, 2025.    Follow Up In Primary Care - Established; Future    Insomnia, unspecified type  Stable refill Ambien 10 mg nightly as needed    I have personally reviewed the OARRS report.  I have considered the risks of abuse,dependence, addiction and diversion.  I believe it is clinically appropriate to prescribe this medication.       Orders:    zolpidem (Ambien) 10 mg tablet; TAKE 1 TABLET BY MOUTH AT BEDTIME AS NEEDED FOR SLEEP    Follow Up In Primary Care - Established; Future    Screening for prostate cancer  PSA    Orders:    Prostate Specific Antigen, Screen; Future    Vitamin D deficiency  Monitor supplement as needed    Orders:    Vitamin D 25-Hydroxy,Total (for eval of Vitamin D levels); Future    Other fatigue  TSH with reflex    Orders:    TSH with reflex to Free T4 if abnormal; Future

## 2025-08-11 PROCEDURE — RXMED WILLOW AMBULATORY MEDICATION CHARGE

## 2025-08-12 ENCOUNTER — PHARMACY VISIT (OUTPATIENT)
Dept: PHARMACY | Facility: CLINIC | Age: 75
End: 2025-08-12
Payer: MEDICARE

## 2025-08-21 ENCOUNTER — PHARMACY VISIT (OUTPATIENT)
Dept: PHARMACY | Facility: CLINIC | Age: 75
End: 2025-08-21
Payer: MEDICARE

## 2025-08-21 PROCEDURE — RXMED WILLOW AMBULATORY MEDICATION CHARGE

## 2025-11-24 ENCOUNTER — APPOINTMENT (OUTPATIENT)
Dept: PRIMARY CARE | Facility: CLINIC | Age: 75
End: 2025-11-24
Payer: MEDICARE